# Patient Record
Sex: FEMALE | Race: BLACK OR AFRICAN AMERICAN | NOT HISPANIC OR LATINO | Employment: UNEMPLOYED | URBAN - NONMETROPOLITAN AREA
[De-identification: names, ages, dates, MRNs, and addresses within clinical notes are randomized per-mention and may not be internally consistent; named-entity substitution may affect disease eponyms.]

---

## 2017-08-10 PROCEDURE — 87660 TRICHOMONAS VAGIN DIR PROBE: CPT | Performed by: NURSE PRACTITIONER

## 2017-08-10 PROCEDURE — 87510 GARDNER VAG DNA DIR PROBE: CPT | Performed by: NURSE PRACTITIONER

## 2017-08-10 PROCEDURE — 87480 CANDIDA DNA DIR PROBE: CPT | Performed by: NURSE PRACTITIONER

## 2018-03-15 ENCOUNTER — APPOINTMENT (OUTPATIENT)
Dept: LAB | Facility: HOSPITAL | Age: 28
End: 2018-03-15

## 2018-03-15 ENCOUNTER — INITIAL PRENATAL (OUTPATIENT)
Dept: OBSTETRICS AND GYNECOLOGY | Facility: CLINIC | Age: 28
End: 2018-03-15

## 2018-03-15 VITALS — BODY MASS INDEX: 28.15 KG/M2 | WEIGHT: 149 LBS | SYSTOLIC BLOOD PRESSURE: 107 MMHG | DIASTOLIC BLOOD PRESSURE: 72 MMHG

## 2018-03-15 DIAGNOSIS — Z34.80 PRENATAL CARE OF MULTIGRAVIDA, ANTEPARTUM: ICD-10-CM

## 2018-03-15 DIAGNOSIS — O26.859 SPOTTING AFFECTING PREGNANCY, ANTEPARTUM: Primary | ICD-10-CM

## 2018-03-15 DIAGNOSIS — Z3A.00 WEEKS OF GESTATION OF PREGNANCY NOT SPECIFIED: ICD-10-CM

## 2018-03-15 LAB
ABO GROUP BLD: NORMAL
AMPHET+METHAMPHET UR QL: NEGATIVE
BARBITURATES UR QL SCN: NEGATIVE
BASOPHILS # BLD AUTO: 0.02 10*3/MM3 (ref 0–0.2)
BASOPHILS NFR BLD AUTO: 0.3 % (ref 0–2)
BENZODIAZ UR QL SCN: NEGATIVE
BILIRUB UR QL STRIP: NEGATIVE
BLD GP AB SCN SERPL QL: NEGATIVE
CANNABINOIDS SERPL QL: NEGATIVE
CLARITY UR: CLEAR
COCAINE UR QL: NEGATIVE
COLOR UR: YELLOW
DEPRECATED RDW RBC AUTO: 41.1 FL (ref 36.4–46.3)
EOSINOPHIL # BLD AUTO: 0.03 10*3/MM3 (ref 0–0.7)
EOSINOPHIL NFR BLD AUTO: 0.4 % (ref 0–7)
ERYTHROCYTE [DISTWIDTH] IN BLOOD BY AUTOMATED COUNT: 12.7 % (ref 11.5–14.5)
GLUCOSE UR STRIP-MCNC: NEGATIVE MG/DL
HCG INTACT+B SERPL-ACNC: 6307 MIU/ML
HCT VFR BLD AUTO: 36.1 % (ref 35–45)
HGB BLD-MCNC: 12.5 G/DL (ref 12–15.5)
HGB UR QL STRIP.AUTO: ABNORMAL
IMM GRANULOCYTES # BLD: 0.02 10*3/MM3 (ref 0–0.02)
IMM GRANULOCYTES NFR BLD: 0.3 % (ref 0–0.5)
KETONES UR QL STRIP: NEGATIVE
LEUKOCYTE ESTERASE UR QL STRIP.AUTO: NEGATIVE
LYMPHOCYTES # BLD AUTO: 1.27 10*3/MM3 (ref 0.6–4.2)
LYMPHOCYTES NFR BLD AUTO: 17.3 % (ref 10–50)
Lab: NORMAL
MCH RBC QN AUTO: 30.4 PG (ref 26.5–34)
MCHC RBC AUTO-ENTMCNC: 34.6 G/DL (ref 31.4–36)
MCV RBC AUTO: 87.8 FL (ref 80–98)
METHADONE UR QL SCN: NEGATIVE
MONOCYTES # BLD AUTO: 0.51 10*3/MM3 (ref 0–0.9)
MONOCYTES NFR BLD AUTO: 7 % (ref 0–12)
NEUTROPHILS # BLD AUTO: 5.48 10*3/MM3 (ref 2–8.6)
NEUTROPHILS NFR BLD AUTO: 74.7 % (ref 37–80)
NITRITE UR QL STRIP: NEGATIVE
OPIATES UR QL: NEGATIVE
OXYCODONE UR QL SCN: NEGATIVE
PH UR STRIP.AUTO: 7 [PH] (ref 5–9)
PLATELET # BLD AUTO: 284 10*3/MM3 (ref 150–450)
PMV BLD AUTO: 10.5 FL (ref 8–12)
PROT UR QL STRIP: NEGATIVE
RBC # BLD AUTO: 4.11 10*6/MM3 (ref 3.77–5.16)
RH BLD: POSITIVE
SP GR UR STRIP: 1.03 (ref 1–1.03)
UROBILINOGEN UR QL STRIP: ABNORMAL
WBC NRBC COR # BLD: 7.33 10*3/MM3 (ref 3.2–9.8)

## 2018-03-15 PROCEDURE — 87491 CHLMYD TRACH DNA AMP PROBE: CPT | Performed by: ADVANCED PRACTICE MIDWIFE

## 2018-03-15 PROCEDURE — G0432 EIA HIV-1/HIV-2 SCREEN: HCPCS | Performed by: ADVANCED PRACTICE MIDWIFE

## 2018-03-15 PROCEDURE — 86803 HEPATITIS C AB TEST: CPT | Performed by: ADVANCED PRACTICE MIDWIFE

## 2018-03-15 PROCEDURE — 80307 DRUG TEST PRSMV CHEM ANLYZR: CPT | Performed by: ADVANCED PRACTICE MIDWIFE

## 2018-03-15 PROCEDURE — 87661 TRICHOMONAS VAGINALIS AMPLIF: CPT | Performed by: ADVANCED PRACTICE MIDWIFE

## 2018-03-15 PROCEDURE — 87591 N.GONORRHOEAE DNA AMP PROB: CPT | Performed by: ADVANCED PRACTICE MIDWIFE

## 2018-03-15 PROCEDURE — 87086 URINE CULTURE/COLONY COUNT: CPT | Performed by: ADVANCED PRACTICE MIDWIFE

## 2018-03-15 PROCEDURE — 36415 COLL VENOUS BLD VENIPUNCTURE: CPT | Performed by: ADVANCED PRACTICE MIDWIFE

## 2018-03-15 PROCEDURE — 99212 OFFICE O/P EST SF 10 MIN: CPT | Performed by: ADVANCED PRACTICE MIDWIFE

## 2018-03-15 PROCEDURE — 84702 CHORIONIC GONADOTROPIN TEST: CPT | Performed by: ADVANCED PRACTICE MIDWIFE

## 2018-03-15 PROCEDURE — 81003 URINALYSIS AUTO W/O SCOPE: CPT | Performed by: ADVANCED PRACTICE MIDWIFE

## 2018-03-15 RX ORDER — PRENATAL VIT NO.126/IRON/FOLIC 28MG-0.8MG
TABLET ORAL DAILY
COMMUNITY
End: 2019-05-30

## 2018-03-16 LAB
BACTERIA SPEC AEROBE CULT: NORMAL
C TRACH RRNA CVX QL NAA+PROBE: NEGATIVE
HBV SURFACE AG SERPL QL IA: NEGATIVE
HCV AB SER DONR QL: NEGATIVE
HIV1+2 AB SER QL: NEGATIVE
N GONORRHOEA RRNA SPEC QL NAA+PROBE: NEGATIVE
RPR SER QL: NORMAL
RUBV IGG SER QL: ABNORMAL
RUBV IGG SER-ACNC: 224 IU/ML (ref 0–9.9)
T VAGINALIS DNA VAG QL PROBE+SIG AMP: NEGATIVE

## 2018-03-16 NOTE — PROGRESS NOTES
CC: New OB visit, history reviewed      ROS:Positive Nausea   Negative leaking fluid from the vagina, swelling in her legs, headache and visual changes      Educated on:Spent 30 minutes out of 45 minutes face to face counseling on nutrition, activity, diet, safety, prenatal care, medications approved in pregnancy, pregnancy discomforts, and testing.       A/Plan: f/u in 1 week/s   Ella was seen today for initial prenatal visit.    Diagnoses and all orders for this visit:    Spotting affecting pregnancy, antepartum  -     hCG, Quantitative, Pregnancy    Prenatal care of multigravida, antepartum  -     OB Panel With HIV  -     Cancel: Hepatitis C Antibody  -     Urine Culture - Urine, Urine, Clean Catch  -     Urinalysis - Urine, Clean Catch  -     Urine Drug Screen - Urine, Clean Catch  -     Chlamydia trachomatis, Neisseria gonorrhoeae, Trichomonas vaginalis, PCR - Urine, Urine, Clean Catch  -     RPR  -     CBC Auto Differential  -     Hepatitis B Surface Antigen  -     Rubella Antibody, IgG  -     OB Panel Type & Screen  -     HIV-1 & HIV-2 Antibodies  -     Hepatitis C Antibody  -     PREVIOUS HISTORY; Future  -     PREVIOUS HISTORY    Weeks of gestation of pregnancy not specified  -      Ob Transvaginal; Future

## 2018-03-21 ENCOUNTER — HOSPITAL ENCOUNTER (EMERGENCY)
Facility: HOSPITAL | Age: 28
Discharge: HOME | End: 2018-03-21
Attending: EMERGENCY MEDICINE | Admitting: EMERGENCY MEDICINE

## 2018-03-21 VITALS
TEMPERATURE: 99.2 F | BODY MASS INDEX: 27.23 KG/M2 | DIASTOLIC BLOOD PRESSURE: 76 MMHG | WEIGHT: 148 LBS | OXYGEN SATURATION: 100 % | RESPIRATION RATE: 18 BRPM | SYSTOLIC BLOOD PRESSURE: 133 MMHG | HEIGHT: 62 IN | HEART RATE: 73 BPM

## 2018-03-21 DIAGNOSIS — O03.9 MISCARRIAGE: Primary | ICD-10-CM

## 2018-03-21 LAB — HCG INTACT+B SERPL-ACNC: 3759.7 MIU/ML

## 2018-03-21 PROCEDURE — 99284 EMERGENCY DEPT VISIT MOD MDM: CPT

## 2018-03-21 PROCEDURE — 88305 TISSUE EXAM BY PATHOLOGIST: CPT | Performed by: PATHOLOGY

## 2018-03-21 PROCEDURE — 84702 CHORIONIC GONADOTROPIN TEST: CPT | Performed by: EMERGENCY MEDICINE

## 2018-03-21 PROCEDURE — 88305 TISSUE EXAM BY PATHOLOGIST: CPT | Performed by: EMERGENCY MEDICINE

## 2018-03-21 PROCEDURE — 96360 HYDRATION IV INFUSION INIT: CPT

## 2018-03-21 RX ORDER — HYDROCODONE BITARTRATE AND ACETAMINOPHEN 5; 325 MG/1; MG/1
1 TABLET ORAL EVERY 6 HOURS PRN
Status: DISCONTINUED | OUTPATIENT
Start: 2018-03-21 | End: 2018-03-22 | Stop reason: HOSPADM

## 2018-03-21 RX ORDER — HYDROCODONE BITARTRATE AND ACETAMINOPHEN 5; 325 MG/1; MG/1
1 TABLET ORAL ONCE
Status: COMPLETED | OUTPATIENT
Start: 2018-03-21 | End: 2018-03-21

## 2018-03-21 RX ORDER — IBUPROFEN 800 MG/1
800 TABLET ORAL EVERY 8 HOURS PRN
Qty: 30 TABLET | Refills: 0 | Status: SHIPPED | OUTPATIENT
Start: 2018-03-21 | End: 2019-05-30

## 2018-03-21 RX ORDER — SODIUM CHLORIDE 9 MG/ML
INJECTION, SOLUTION INTRAVENOUS
Status: COMPLETED
Start: 2018-03-21 | End: 2018-03-21

## 2018-03-21 RX ADMIN — SODIUM CHLORIDE 1000 ML: 900 INJECTION, SOLUTION INTRAVENOUS at 22:00

## 2018-03-21 RX ADMIN — HYDROCODONE BITARTRATE AND ACETAMINOPHEN 1 TABLET: 5; 325 TABLET ORAL at 21:30

## 2018-03-22 NOTE — ED NOTES
Called Corrie in Pharmacy to let her know prescription had been faxed, she said she will call when its ready.     Adilia Nicholas RN  03/21/18 [id number]

## 2018-03-22 NOTE — ED NOTES
"Pt [facility] positive preg test 3-6-18 [facility]. Pt had US on 3-15 and states \"they didn't see or hear anything.\" States she has been spotting since the 15th and passed a large clot today. Patient brought in products of conception which is observed to be a small fetus within an amniotic sac. By reported LMP of 1/5/18 the pt was approx. 10w 5d.      Adilia Nicholas RN  03/21/18 [id number]    "

## 2018-03-22 NOTE — ED PROVIDER NOTES
Subjective   History of Present Illness  28-year-old female presents to the emergency department because of lower abdominal pain and cramping with increasing bleeding and blood clots per vagina.  She is a  female was recently seen in OB clinic for this pregnancy.  She is approximately 10 weeks gestational age.  In the clinic she had a beta quant of 6000 and ultrasound that did not show any process of conception.  Today she comes in because of increased bleeding cramping actually passed proximal of conception while the emergency department.  She is complaining some lower abdominal pain and little bit of bleeding.  She has an appointment next week to see  Friday  Review of Systems   Constitutional: Negative for chills and fever.   HENT: Negative for rhinorrhea, sinus pressure and sneezing.    Eyes: Negative for pain and redness.   Respiratory: Negative for cough, chest tightness and shortness of breath.    Gastrointestinal: Negative for abdominal pain, diarrhea, nausea and vomiting.   Genitourinary: Positive for pelvic pain and vaginal bleeding. Negative for dysuria and flank pain.   Musculoskeletal: Negative for arthralgias, back pain and joint swelling.   Neurological: Negative for dizziness, syncope, weakness, numbness and headaches.   Hematological: Negative.    Psychiatric/Behavioral: Negative.        Past Medical History:   Diagnosis Date   • Chlamydia    • Migraine        No Known Allergies    Past Surgical History:   Procedure Laterality Date   •  SECTION      Low Transverse 10/24/2006       Family History   Problem Relation Age of Onset   • Diabetes Mother        Social History     Social History   • Marital status: Single     Social History Main Topics   • Smoking status: Former Smoker     Quit date: 3/13/2018   • Smokeless tobacco: Current User   • Alcohol use Yes      Comment: weekends    • Drug use: No   • Sexual activity: Yes     Partners: Male     Other Topics Concern   • Not on file            Objective   Physical Exam   Constitutional: She is oriented to person, place, and time. She appears well-developed and well-nourished.   HENT:   Head: Normocephalic and atraumatic.   Eyes: EOM are normal. Pupils are equal, round, and reactive to light.   Neck: Normal range of motion. Neck supple.   Cardiovascular: Normal rate and regular rhythm.    Pulmonary/Chest: Effort normal.   Abdominal: Soft. Bowel sounds are normal. She exhibits no distension. There is no tenderness. There is no guarding.   Genitourinary:   Genitourinary Comments: Os is closed.  She has some blood clots in the vaginal vault with a small amount of oozing of blood from the cervix.  Mild tenderness.  Do not see any obvious retained process of conception in the cervical canal.   Musculoskeletal: Normal range of motion.   Neurological: She is alert and oriented to person, place, and time.   Skin: Skin is warm and dry. Capillary refill takes less than 2 seconds.   Psychiatric: She has a normal mood and affect.   Nursing note and vitals reviewed.      Procedures         ED Course  ED Course        Labs Reviewed   HCG, QUANTITATIVE, PREGNANCY               MDM  Number of Diagnoses or Management Options  Diagnosis management comments: Patient with a miscarriage.  We have sent the products of conception to the pathology lab.  We will order a beta Quant for trending.  Pain control and follow-up with OB/GYN.  I discussed the case with  Frisocorro who agrees the patient can be seen next week in clinic.  She is hemodynamically stable.  She is not having a large amount of bleeding.  Discussed return precautions with the patient.  Patient had an ABO Rh done last week that showed that the patient was Rh+ so she does not rhogam.      Final diagnoses:   Miscarriage            Bladimir Siegel MD  03/21/18 [id number]

## 2018-03-23 DIAGNOSIS — O02.1 MISSED AB: Primary | ICD-10-CM

## 2018-03-26 ENCOUNTER — OFFICE VISIT (OUTPATIENT)
Dept: OBSTETRICS AND GYNECOLOGY | Facility: CLINIC | Age: 28
End: 2018-03-26

## 2018-03-26 ENCOUNTER — LAB (OUTPATIENT)
Dept: LAB | Facility: HOSPITAL | Age: 28
End: 2018-03-26

## 2018-03-26 VITALS
BODY MASS INDEX: 28.16 KG/M2 | WEIGHT: 153 LBS | DIASTOLIC BLOOD PRESSURE: 63 MMHG | HEIGHT: 62 IN | SYSTOLIC BLOOD PRESSURE: 98 MMHG

## 2018-03-26 DIAGNOSIS — O02.1 MISSED AB: ICD-10-CM

## 2018-03-26 DIAGNOSIS — O03.9 SPONTANEOUS ABORTION: Primary | ICD-10-CM

## 2018-03-26 LAB — HCG INTACT+B SERPL-ACNC: 107.3 MIU/ML

## 2018-03-26 PROCEDURE — 99212 OFFICE O/P EST SF 10 MIN: CPT | Performed by: ADVANCED PRACTICE MIDWIFE

## 2018-03-26 PROCEDURE — 36415 COLL VENOUS BLD VENIPUNCTURE: CPT

## 2018-03-26 PROCEDURE — 84702 CHORIONIC GONADOTROPIN TEST: CPT

## 2018-03-26 NOTE — PROGRESS NOTES
"Subjective   Chief Complaint   Patient presents with   • Follow-up     was in ER on 3/21 for bleeding in pregnancy. quant today 107.30. pt states she is in no pain now and she stopped bleeding      Ella Suazo is a 28 y.o. year old  who comes to review her recent testing and discuss next steps.      Review of Systems   Respiratory: Negative.    Cardiovascular: Negative.    Gastrointestinal: Negative for abdominal pain.   Genitourinary: Negative for vaginal bleeding.   Skin: Negative.           Objective   BP 98/63   Ht 157.5 cm (62\")   Wt 69.4 kg (153 lb)   LMP 2018 (Within Days)   Breastfeeding? No   BMI 27.98 kg/m²            Lab Review   HCG      Imaging   No data reviewed           Assessment   1. Spontaneous      Plan   1. Repeat quantitative HCG next week  2. Will wait a few months before trying to conceive again         This note was electronically signed.    "

## 2018-03-27 LAB
LAB AP CASE REPORT: NORMAL
LAB AP CLINICAL INFORMATION: NORMAL
Lab: NORMAL
PATH REPORT.FINAL DX SPEC: NORMAL
PATH REPORT.GROSS SPEC: NORMAL

## 2019-05-23 ENCOUNTER — OFFICE VISIT (OUTPATIENT)
Dept: FAMILY MEDICINE CLINIC | Facility: CLINIC | Age: 29
End: 2019-05-23

## 2019-05-23 VITALS
BODY MASS INDEX: 25.84 KG/M2 | HEART RATE: 88 BPM | WEIGHT: 140.44 LBS | DIASTOLIC BLOOD PRESSURE: 80 MMHG | HEIGHT: 62 IN | SYSTOLIC BLOOD PRESSURE: 116 MMHG | OXYGEN SATURATION: 99 %

## 2019-05-23 DIAGNOSIS — K64.4 EXTERNAL HEMORRHOIDS: Primary | ICD-10-CM

## 2019-05-23 PROCEDURE — 99213 OFFICE O/P EST LOW 20 MIN: CPT | Performed by: FAMILY MEDICINE

## 2019-05-23 NOTE — PROGRESS NOTES
Subjective:     Ella Suazo is a 29 y.o. female who presents for initial evaluation for hemorrhoids.     Patient presents for evaluation of possible hemorrhoids. Onset of symptoms was sudden. Symptoms have been gradually worsening since that time; symptoms worsen through the course of the day. Symptoms include: pain with sitting and pain upon rising. Reports daily BMs. Denies pain with defecation and rectal bleeding. Patient denies family hx of colorectal CA and melena.  Treatment to date has been cleaning with warm wash cloths.    Preventative:  PHQ-9 Depression Screening  Little interest or pleasure in doing things? 2   Feeling down, depressed, or hopeless? 2   Trouble falling or staying asleep, or sleeping too much? 2   Feeling tired or having little energy? 2   Poor appetite or overeating? 3   Feeling bad about yourself - or that you are a failure or have let yourself or your family down? 2   Trouble concentrating on things, such as reading the newspaper or watching television? 3   Moving or speaking so slowly that other people could have noticed? Or the opposite - being so fidgety or restless that you have been moving around a lot more than usual? 1   Thoughts that you would be better off dead, or of hurting yourself in some way? 0   PHQ-9 Total Score 17   If you checked off any problems, how difficult have these problems made it for you to do your work, take care of things at home, or get along with other people? Somewhat difficult     Total Score   Depression Severity  1-4    Minimal depression  5-9    Mild depression  10-14    Moderate depression  15-19    Moderately severe depression  20-27    Severe depression    On osteoporosis therapy? No     Past Medical Hx:  Past Medical History:   Diagnosis Date   • Chlamydia    • Migraine        Past Surgical Hx:  Past Surgical History:   Procedure Laterality Date   •  SECTION      Low Transverse 10/24/2006       Health Maintenance:  Health Maintenance    Topic Date Due   • ANNUAL PHYSICAL  1991   • TDAP/TD VACCINES (1 - Tdap) 2007   • PAP SMEAR  08/10/2017   • INFLUENZA VACCINE  2019       Current Meds:    Current Outpatient Medications:   •  ibuprofen (ADVIL,MOTRIN) 800 MG tablet, Take 1 tablet by mouth Every 8 (Eight) Hours As Needed for Mild Pain ., Disp: 30 tablet, Rfl: 0  •  Prenatal Vit-Fe Fumarate-FA (PRENATAL, CLASSIC, VITAMIN) 28-0.8 MG tablet tablet, Take  by mouth Daily., Disp: , Rfl:     Allergies:  Patient has no known allergies.    Family Hx:  Family History   Problem Relation Age of Onset   • Diabetes Mother         Social History:  Social History     Socioeconomic History   • Marital status: Single     Spouse name: Not on file   • Number of children: Not on file   • Years of education: Not on file   • Highest education level: Not on file   Tobacco Use   • Smoking status: Former Smoker     Last attempt to quit: 3/13/2018     Years since quittin.1   • Smokeless tobacco: Current User   Substance and Sexual Activity   • Alcohol use: Yes     Comment: weekends    • Drug use: No   • Sexual activity: Yes     Partners: Male       Review of Systems  Review of Systems   Constitutional: Negative for chills, diaphoresis and fever.   HENT: Negative for congestion, rhinorrhea, sneezing and sore throat.    Eyes: Negative for discharge and redness.   Respiratory: Negative for cough, shortness of breath and wheezing.    Cardiovascular: Negative for chest pain and leg swelling.   Gastrointestinal: Positive for rectal pain. Negative for abdominal pain, diarrhea, nausea and vomiting.   Genitourinary: Negative for difficulty urinating, dysuria, genital sores and hematuria.   Musculoskeletal: Negative for gait problem and joint swelling.   Skin: Negative for rash and wound.   Neurological: Negative for seizures, syncope, light-headedness and headaches.   Psychiatric/Behavioral: Negative for behavioral problems, confusion and sleep disturbance.  "      Objective:     /80   Pulse 88   Ht 157.5 cm (62\")   Wt 63.7 kg (140 lb 7 oz)   SpO2 99%   BMI 25.69 kg/m²     Physical Exam   Constitutional: She is oriented to person, place, and time. She appears well-developed and well-nourished. No distress.   HENT:   Head: Normocephalic and atraumatic.   Nose: Nose normal.   Mouth/Throat: Oropharynx is clear and moist.   Eyes: Conjunctivae and EOM are normal. Pupils are equal, round, and reactive to light. No scleral icterus.   Neck: Neck supple. No tracheal deviation present. No thyromegaly present.   Cardiovascular: Normal rate, regular rhythm, normal heart sounds and intact distal pulses.   Pulmonary/Chest: Effort normal and breath sounds normal.   Abdominal: Soft. Bowel sounds are normal. There is no tenderness.   Genitourinary: Rectal exam shows external hemorrhoid and tenderness. Rectal exam shows no fissure.   Genitourinary Comments: Exam chaperoned by Bronson Jaramillo MA   Musculoskeletal: She exhibits no edema or deformity.   Lymphadenopathy:     She has no cervical adenopathy.   Neurological: She is alert and oriented to person, place, and time.   Skin: Skin is warm and dry. Capillary refill takes less than 2 seconds. No rash noted. She is not diaphoretic.   Psychiatric: She has a normal mood and affect. Her behavior is normal. Judgment and thought content normal.   Vitals reviewed.      Lab Results   Component Value Date    WBC 7.33 03/15/2018    HGB 12.5 03/15/2018    HCT 36.1 03/15/2018    MCV 87.8 03/15/2018     03/15/2018     Lab Results   Component Value Date    GLUCOSE 98 07/10/2015    BUN 6 (L) 07/10/2015    CREATININE 0.6 07/10/2015    K 4.2 07/10/2015    CO2 26 07/10/2015    CALCIUM 9.2 07/10/2015        Assessment/Plan:     Ella was seen today for hemorrhoids.    Diagnoses and all orders for this visit:    External hemorrhoids  -    Hemorrhoids are not thrombosed.  - phenylephrine-mineral oil-petrolatum (PREPARATION H) 0.25-14-74.9 % " ointment hemorrhoidal ointment; Insert 1 application into the rectum 2 (Two) Times a Day As Needed (hemorrhoids).  - Patient provided with educational material on sitz baths.  - Encourage p.o. hydration and increasing fiber intake to avoid constipation and hard stools.    Follow-up:     Return in about 1 week (around 5/30/2019) for Recheck hemorrhoids.    Goals        Patient Stated    • Pain relief (pt-stated)      Patient evaluated for external hemorrhoids.  Barriers to goal: None            Preventative:  -Patient's Body mass index is 25.69 kg/m². BMI is within normal parameters. No follow-up required..    -Screening pap smear: not up to date. Will address at next visit.    Vaccines:    There is no immunization history on file for this patient.  Records requested.    Tetanus vaccine: not up to date. Records requested.   Annual influenza vaccine: not up to date. Will address during influenza season.  Pneumococcal vaccine: not up to date. Will address at next visit.  Hep B vaccine: not up to date. Records requested.       RISK SCORE: 3    Signature  Tammi Martinez MD  [facility] Family Medicine Resident, PGY III        This document has been electronically signed by Tammi Martinez MD on May 23, 2019 11:04 AM

## 2019-05-23 NOTE — PATIENT INSTRUCTIONS
How to Take a Sitz Bath  A sitz bath is a warm water bath that is taken while you are sitting down. The water should only come up to your hips and should cover your buttocks. Your health care provider may recommend a sitz bath to help you:  · Clean the lower part of your body, including your genital area.  · With itching.  · With pain.  · With sore muscles or muscles that tighten or spasm.    How to take a sitz bath  Take 3-4 sitz baths per day or as told by your health care provider.  1. Partially fill a bathtub with warm water. You will only need the water to be deep enough to cover your hips and buttocks when you are sitting in it.  2. If your health care provider told you to put medicine in the water, follow the directions exactly.  3. Sit in the water and open the tub drain a little.  4. Turn on the warm water again to keep the tub at the correct level. Keep the water running constantly.  5. Soak in the water for 15-20 minutes or as told by your health care provider.  6. After the sitz bath, pat the affected area dry first. Do not rub it.  7. Be careful when you stand up after the sitz bath because you may feel dizzy.    Contact a health care provider if:  · Your symptoms get worse. Do not continue with sitz baths if your symptoms get worse.  · You have new symptoms. Do not continue with sitz baths until you talk with your health care provider.  This information is not intended to replace advice given to you by your health care provider. Make sure you discuss any questions you have with your health care provider.  Document Released: 09/09/2005 Document Revised: 05/17/2017 Document Reviewed: 12/16/2015  [employer] Interactive Patient Education © 2019 [employer]

## 2019-05-28 NOTE — PROGRESS NOTES
I have seen the patient.  I have reviewed the notes, assessments, and/or procedures performed by Tammi Martinez MD, I concur with her/his documentation and assessment and plan for Ella Monteiroa Gabriele.               This document has been electronically signed by Oscar Richardson MD on May 28, 2019 6:36 PM

## 2019-05-30 ENCOUNTER — OFFICE VISIT (OUTPATIENT)
Dept: FAMILY MEDICINE CLINIC | Facility: CLINIC | Age: 29
End: 2019-05-30

## 2019-05-30 VITALS
HEART RATE: 90 BPM | SYSTOLIC BLOOD PRESSURE: 112 MMHG | OXYGEN SATURATION: 99 % | HEIGHT: 62 IN | WEIGHT: 142.5 LBS | BODY MASS INDEX: 26.22 KG/M2 | DIASTOLIC BLOOD PRESSURE: 78 MMHG

## 2019-05-30 DIAGNOSIS — F41.1 GENERALIZED ANXIETY DISORDER: ICD-10-CM

## 2019-05-30 DIAGNOSIS — Z72.0 TOBACCO USE: ICD-10-CM

## 2019-05-30 DIAGNOSIS — K64.4 EXTERNAL HEMORRHOID: Primary | ICD-10-CM

## 2019-05-30 PROCEDURE — 99213 OFFICE O/P EST LOW 20 MIN: CPT | Performed by: FAMILY MEDICINE

## 2019-05-30 PROCEDURE — 99406 BEHAV CHNG SMOKING 3-10 MIN: CPT | Performed by: FAMILY MEDICINE

## 2019-05-30 RX ORDER — CITALOPRAM 10 MG/1
TABLET ORAL
Qty: 42 TABLET | Refills: 0 | Status: SHIPPED | OUTPATIENT
Start: 2019-05-30 | End: 2019-06-27

## 2019-05-30 NOTE — PATIENT INSTRUCTIONS
Supporting Someone With Anxiety  Anxiety disorders are mental health conditions that cause overwhelming feelings of nervousness or worry. These feelings interfere with daily activities and relationships. Anxiety disorders include:  · Generalized anxiety disorder (SONIA).  · Social anxiety.  · Post-traumatic stress disorder (PTSD).    When a person has an anxiety disorder, his or her condition can affect others around him or her, such as friends and family members. Friends and family can help by offering support and understanding.  What do I need to know about this condition?  Anxiety is the mental and physical experience of nervousness or worry that you might feel when you think about a stressful event. Occasional anxiety is normal, but a person with an anxiety disorder becomes preoccupied with this worry. He or she may know that the anxiety is not logical, but knowing this does not relieve the discomfort that he or she feels. Anxiety disorders cause a great deal of distress and prevent someone from having a normal daily life. Someone with an anxiety disorder may:  · Experience anxiety that:  ? May or may not have a specific trigger.  ? Lasts for long periods of time.  ? Causes physical problems over time.  ? Is far more intense than normal anticipation.  ? Occurs at unpredictable times.  · Feel restless or edgy.  · Get fatigued easily.  · Have trouble focusing.  · Have muscle tension.  · Have trouble falling asleep or staying asleep.  · Be irritable and occasionally have sudden expressions of strong feelings (outbursts).  · Have worries that do not make sense to you.    What do I need to know about the treatment options?  Anxiety disorders are generally very treatable by mental health providers such as psychologists, psychiatrists, and clinical social workers. Treatment may include one or more of the following:  · Psychotherapy, also called talk therapy or counseling. Types of psychotherapy that are used to treat  anxiety include:  ? Cognitive behavioral therapy (CBT). This type of therapy teaches a person how to recognize unhealthy feelings, thoughts, and behaviors, and how to replace those feelings with positive thoughts and actions.  ? Behavior therapy that trains a person to relax and self-soothe. This also involves gradually exposing the person to the cause of the anxiety (progressive exposure therapy).  ? Biofeedback. This type of therapy focuses on trying to control certain body functions, like heart rate, to lessen the physical impact of anxiety.  ? Mindfulness-based stress reduction training. This uses education, meditation, and yoga to help a person stay focused on the present instead of living in the past or worrying about the future.  ? Acceptance and commitment therapy (ACT). This helps a person to focus on acceptance, rather than trying to control every situation.  ? Family therapy. This treatment helps family members to communicate and deal with conflict in healthy ways.  · Medicine to treat anxiety and help to control certain emotions and behaviors.  · Mind-body programs. These programs encourage the person with anxiety to be involved in his or her treatment and feel empowered. Mind-body programs may include mindfulness-based stress reduction training, yoga, or emigdio chi.    How can I support my loved one?  Talk about the condition  Good communication is the key to supporting your friend or family member. Here are a few things to keep in mind:  · Be careful about too much prodding. Try not to overdo reminders to an adult friend or family member about things like taking medicines. Ask how your loved one prefers that you help.  · Ask questions and then listen to your loved one's response. Be available if your friend or family member wants to talk, but give your loved one space if he or she does not feel like talking.  · Never ignore comments about suicide, and do not try to avoid the subject of suicide. Talking  about suicide will not make your loved one want to act on it. You or your loved one can reach out 24 hours a day to get free, private support (on the phone or a live online chat) from a suicide crisis helpline, such as the National Suicide Prevention Lifeline at 1-100.831.2737.  · Be encouraging and offer emotional support. This can help to lower stress. Even saying something simple to comfort your loved one may help.  · If your loved one is open to it, go with him or her to visits with a counselor or health care provider. Get suggestions directly from your loved one's care providers about when to get help if you are concerned about behavior changes. Privacy laws limit how much a person's health care provider can share with you without your loved one's permission, but if you feel that a situation is an emergency, do not wait to call a health care provider or emergency services.    Find support and resources  A health care provider may be able to recommend mental health resources that are available online or over the phone. You could start with:  · Government sites such as the Substance Abuse and Mental Health Services Administration (SAMHSA): www.samhsa.gov  · National mental health organizations such as the National [place] [employer] (NURY): www.nury.org    You may also consider:  · Joining self-help and support groups, not only for your friend or family member, but also for yourself. People in these peer and family support groups understand what you and your loved one are going through. They can help you feel a sense of hope and connect you with local resources to help you learn more.  · Family therapy.    General support  · Make an effort to learn all you can about your loved one's form of anxiety.  · Include your loved one in activities. Invite him or her to go for walks and outings.  · Help your loved one follow his or her treatment plan as directed by health care providers. This could mean driving him  "or her to therapy sessions or suggesting ways to cope with stress.  · Remember that your support really matters. Social support is a huge benefit for someone who is coping with anxiety.  How can I create a safe environment?  · For certain types of anxiety, such as PTSD, you may want to:  ? Remove alcohol and prescription medicines from your loved one's home, or limit the amount of these substances in the home. This can help to prevent your loved one from abusing alcohol and prescription medicines.  ? Remove or lock up guns and other weapons. If you do not have a safe place to keep a gun, local law enforcement may store a gun for you.  · Make a written crisis plan. Include important phone numbers, such as the local crisis intervention team. Make sure that:  ? The person with anxiety knows about this plan and agrees with it.  ? Everyone who has regular contact with the person knows about the plan and knows what to do in an emergency.  ? The written plan is easily accessible and can be quickly put into action.  How should I care for myself?  It is important to find ways to care for your body, mind, and well-being while supporting someone with anxiety.  · Try to maintain your normal routines. This can help you remember that your life is about more than your loved one's condition.  · Understand what your limits are. Say \"no\" to requests or events that lead to a schedule that is too busy.  · Make time for activities that help you relax, and try to not feel guilty about taking time for yourself.  · Spend time with friends and family.  · Consider trying meditation and deep breathing exercises to lower your stress. Attend some mind-body classes by yourself or with your loved one.  · Get plenty of sleep.  · Exercise, even if it is just taking a short walk a few times a week.  · If you are struggling emotionally with guilt, fear, or anger, consider working with a therapist.    What are some signs that the condition is getting " "worse?  Signs that your loved one's condition may be getting worse include:  · Dramatic mood swings.  · Staying away from activities that he or she used to enjoy.  · Drinking more alcohol than normal.  · Either seeming tearful or seeming to lack emotion.  · Talking about \"not feeling right.\"  · Staying away from others (isolating himself or herself).    Get help right away if:  · Your loved one expresses thoughts about harming himself or herself or others.  · Your loved one's behavior becomes hard to predict (erratic).  · Your loved one shows behavior that does not make sense with the current time, place, or circumstances. These behaviors may include seeing, feeling, tasting, or hearing things that are not real (hallucinations) or having flashbacks.  If you ever feel like your loved one may hurt himself or herself or others, or may have thoughts about taking his or her own life, get help right away. You can go to your nearest emergency department or call:  · Your local emergency services (911 in the [place]  · A suicide crisis helpline, such as the National Suicide Prevention Lifeline at 1-921.207.2514. This is open 24 hours a day.    Summary  · People with anxiety disorders experience overwhelming feelings of nervousness or worry. Friends and family can help by offering support and understanding.  · Anxiety disorders are generally very treatable by mental health providers. They can be treated with psychotherapy (also known as talk therapy), behavior therapy, medicine, and mind-body programs.  · Be compassionate and listen to your loved one. Be available if your friend or family member wants to talk, but give your loved one space if he or she does not feel like talking.  · Find ways to care for your own body, mind, and well-being while supporting someone with anxiety. Try to maintain your normal routines and make time to do things that you enjoy.  This information is not intended to replace advice given to you by " your health care provider. Make sure you discuss any questions you have with your health care provider.  Document Released: 05/01/2018 Document Revised: 05/01/2018 Document Reviewed: 05/01/2018  [employer] Interactive Patient Education © 2019 [employer]    Steps to Quit Smoking  Smoking tobacco can be harmful to your health and can affect almost every organ in your body. Smoking puts you, and those around you, at risk for developing many serious chronic diseases. Quitting smoking is difficult, but it is one of the best things that you can do for your health. It is never too late to quit.  What are the benefits of quitting smoking?  When you quit smoking, you lower your risk of developing serious diseases and conditions, such as:  · Lung cancer or lung disease, such as COPD.  · Heart disease.  · Stroke.  · Heart attack.  · Infertility.  · Osteoporosis and bone fractures.    Additionally, symptoms such as coughing, wheezing, and shortness of breath may get better when you quit. You may also find that you get sick less often because your body is stronger at fighting off colds and infections. If you are pregnant, quitting smoking can help to reduce your chances of having a baby of low birth weight.  How do I get ready to quit?  When you decide to quit smoking, create a plan to make sure that you are successful. Before you quit:  · Pick a date to quit. Set a date within the next two weeks to give you time to prepare.  · Write down the reasons why you are quitting. Keep this list in places where you will see it often, such as on your bathroom mirror or in your car or wallet.  · Identify the people, places, things, and activities that make you want to smoke (triggers) and avoid them. Make sure to take these actions:  ? Throw away all cigarettes at home, at work, and in your car.  ? Throw away smoking accessories, such as ashtrays and lighters.  ? Clean your car and make sure to empty the ashtray.  ? Clean your home,  including curtains and carpets.  · Tell your family, friends, and coworkers that you are quitting. Support from your loved ones can make quitting easier.  · Talk with your health care provider about your options for quitting smoking.  · Find out what treatment options are covered by your health insurance.    What strategies can I use to quit smoking?  Talk with your healthcare provider about different strategies to quit smoking. Some strategies include:  · Quitting smoking altogether instead of gradually lessening how much you smoke over a period of time. Research shows that quitting “cold turkey” is more successful than gradually quitting.  · Attending in-person counseling to help you build problem-solving skills. You are more likely to have success in quitting if you attend several counseling sessions. Even short sessions of 10 minutes can be effective.  · Finding resources and support systems that can help you to quit smoking and remain smoke-free after you quit. These resources are most helpful when you use them often. They can include:  ? Online chats with a counselor.  ? Telephone quitlines.  ? Printed self-help materials.  ? Support groups or group counseling.  ? Text messaging programs.  ? Mobile phone applications.  · Taking medicines to help you quit smoking. (If you are pregnant or breastfeeding, talk with your health care provider first.) Some medicines contain nicotine and some do not. Both types of medicines help with cravings, but the medicines that include nicotine help to relieve withdrawal symptoms. Your health care provider may recommend:  ? Nicotine patches, gum, or lozenges.  ? Nicotine inhalers or sprays.  ? Non-nicotine medicine that is taken by mouth.    Talk with your health care provider about combining strategies, such as taking medicines while you are also receiving in-person counseling. Using these two strategies together makes you more likely to succeed in quitting than if you used  either strategy on its own.  If you are pregnant or breastfeeding, talk with your health care provider about finding counseling or other support strategies to quit smoking. Do not take medicine to help you quit smoking unless told to do so by your health care provider.  What things can I do to make it easier to quit?  Quitting smoking might feel overwhelming at first, but there is a lot that you can do to make it easier. Take these important actions:  · Reach out to your family and friends and ask that they support and encourage you during this time. Call telephone quitlines, reach out to support groups, or work with a counselor for support.  · Ask people who smoke to avoid smoking around you.  · Avoid places that trigger you to smoke, such as bars, parties, or smoke-break areas at work.  · Spend time around people who do not smoke.  · Lessen stress in your life, because stress can be a smoking trigger for some people. To lessen stress, try:  ? Exercising regularly.  ? Deep-breathing exercises.  ? Yoga.  ? Meditating.  ? Performing a body scan. This involves closing your eyes, scanning your body from head to toe, and noticing which parts of your body are particularly tense. Purposefully relax the muscles in those areas.  · Download or purchase mobile phone or tablet apps (applications) that can help you stick to your quit plan by providing reminders, tips, and encouragement. There are many free apps, such as QuitGuide from the CDC (Centers for Disease Control and Prevention). You can find other support for quitting smoking (smoking cessation) through smokefree.gov and other websites.    How will I feel when I quit smoking?  Within the first 24 hours of quitting smoking, you may start to feel some withdrawal symptoms. These symptoms are usually most noticeable 2-3 days after quitting, but they usually do not last beyond 2-3 weeks. Changes or symptoms that you might experience include:  · Mood swings.  · Restlessness,  anxiety, or irritation.  · Difficulty concentrating.  · Dizziness.  · Strong cravings for sugary foods in addition to nicotine.  · Mild weight gain.  · Constipation.  · Nausea.  · Coughing or a sore throat.  · Changes in how your medicines work in your body.  · A depressed mood.  · Difficulty sleeping (insomnia).    After the first 2-3 weeks of quitting, you may start to notice more positive results, such as:  · Improved sense of smell and taste.  · Decreased coughing and sore throat.  · Slower heart rate.  · Lower blood pressure.  · Clearer skin.  · The ability to breathe more easily.  · Fewer sick days.    Quitting smoking is very challenging for most people. Do not get discouraged if you are not successful the first time. Some people need to make many attempts to quit before they achieve long-term success. Do your best to stick to your quit plan, and talk with your health care provider if you have any questions or concerns.  This information is not intended to replace advice given to you by your health care provider. Make sure you discuss any questions you have with your health care provider.  Document Released: 12/12/2002 Document Revised: 07/24/2018 Document Reviewed: 05/03/2016  [employer] Interactive Patient Education © 2019 [employer]
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
## 2019-05-31 NOTE — PROGRESS NOTES
I have seen the patient.  I have reviewed the notes, assessments, and/or procedures performed by Tammi Martinez MD, I concur with her/his documentation and assessment and plan for Ella Monteiroa Gabriele.               This document has been electronically signed by Oscar Richardson MD on May 31, 2019 9:18 AM

## 2019-06-21 ENCOUNTER — LAB (OUTPATIENT)
Dept: LAB | Facility: HOSPITAL | Age: 29
End: 2019-06-21

## 2019-06-21 ENCOUNTER — PROCEDURE VISIT (OUTPATIENT)
Dept: FAMILY MEDICINE CLINIC | Facility: CLINIC | Age: 29
End: 2019-06-21

## 2019-06-21 VITALS
HEIGHT: 62 IN | HEART RATE: 81 BPM | DIASTOLIC BLOOD PRESSURE: 76 MMHG | SYSTOLIC BLOOD PRESSURE: 116 MMHG | BODY MASS INDEX: 25.48 KG/M2 | WEIGHT: 138.44 LBS | OXYGEN SATURATION: 99 %

## 2019-06-21 DIAGNOSIS — Z11.51 ENCOUNTER FOR SCREENING FOR HUMAN PAPILLOMAVIRUS (HPV): Primary | ICD-10-CM

## 2019-06-21 DIAGNOSIS — Z11.3 ROUTINE SCREENING FOR STI (SEXUALLY TRANSMITTED INFECTION): ICD-10-CM

## 2019-06-21 DIAGNOSIS — Z11.3 ROUTINE SCREENING FOR STI (SEXUALLY TRANSMITTED INFECTION): Primary | ICD-10-CM

## 2019-06-21 LAB
CANDIDA ALBICANS: NEGATIVE
GARDNERELLA VAGINALIS: NEGATIVE
T VAGINALIS DNA VAG QL PROBE+SIG AMP: NEGATIVE

## 2019-06-21 PROCEDURE — 87480 CANDIDA DNA DIR PROBE: CPT | Performed by: FAMILY MEDICINE

## 2019-06-21 PROCEDURE — G0123 SCREEN CERV/VAG THIN LAYER: HCPCS | Performed by: FAMILY MEDICINE

## 2019-06-21 PROCEDURE — 99395 PREV VISIT EST AGE 18-39: CPT | Performed by: FAMILY MEDICINE

## 2019-06-21 PROCEDURE — 86696 HERPES SIMPLEX TYPE 2 TEST: CPT

## 2019-06-21 PROCEDURE — 86592 SYPHILIS TEST NON-TREP QUAL: CPT

## 2019-06-21 PROCEDURE — 87591 N.GONORRHOEAE DNA AMP PROB: CPT

## 2019-06-21 PROCEDURE — 87661 TRICHOMONAS VAGINALIS AMPLIF: CPT

## 2019-06-21 PROCEDURE — 87491 CHLMYD TRACH DNA AMP PROBE: CPT

## 2019-06-21 PROCEDURE — 86695 HERPES SIMPLEX TYPE 1 TEST: CPT

## 2019-06-21 PROCEDURE — 86694 HERPES SIMPLEX NES ANTBDY: CPT

## 2019-06-21 PROCEDURE — G0432 EIA HIV-1/HIV-2 SCREEN: HCPCS | Performed by: FAMILY MEDICINE

## 2019-06-21 PROCEDURE — 87660 TRICHOMONAS VAGIN DIR PROBE: CPT | Performed by: FAMILY MEDICINE

## 2019-06-21 PROCEDURE — 87510 GARDNER VAG DNA DIR PROBE: CPT | Performed by: FAMILY MEDICINE

## 2019-06-21 PROCEDURE — 36415 COLL VENOUS BLD VENIPUNCTURE: CPT

## 2019-06-21 PROCEDURE — 87624 HPV HI-RISK TYP POOLED RSLT: CPT | Performed by: FAMILY MEDICINE

## 2019-06-22 LAB
C TRACH RRNA CVX QL NAA+PROBE: NEGATIVE
HIV1+2 AB SER QL: NORMAL
N GONORRHOEA RRNA SPEC QL NAA+PROBE: NEGATIVE
RPR SER QL: NORMAL
TRICHOMONAS VAGINALIS PCR: NEGATIVE

## 2019-06-24 ENCOUNTER — TELEPHONE (OUTPATIENT)
Dept: FAMILY MEDICINE CLINIC | Facility: CLINIC | Age: 29
End: 2019-06-24

## 2019-06-24 LAB
HSV1 IGG SER IA-ACNC: <0.91 INDEX (ref 0–0.9)
HSV1+2 IGM SER IA-ACNC: 2.59 RATIO (ref 0–0.9)
HSV2 IGG SER IA-ACNC: <0.91 INDEX (ref 0–0.9)

## 2019-06-24 NOTE — PROGRESS NOTES
Subjective:     Ella Suazo is a 29 y.o.  female who presents for cervical cancer screening.     Cervical Cancer Screening  Age of Menarch: 11  Period Cycle (Days): 30  Period Duration (Days): 4  Period Pattern: Regular  Menstrual Flow: Moderate  Dysmenorrhea: None  History of abnormal pap smear: None  Family History: No history of breast, cervical, ovarian, or uterine cancer    Sexual History:  Number of Partners in Last Year: 1 years  Sexually Transmitted Infection History: Chlamydia, Gonorrhea, Trichinosis  Patient requesting an STI screening    Preventative:  On osteoporosis therapy? No     Past Medical Hx:  Past Medical History:   Diagnosis Date   • Chlamydia    • Migraine        Past Surgical Hx:  Past Surgical History:   Procedure Laterality Date   •  SECTION      Low Transverse 10/24/2006       Health Maintenance:  Health Maintenance   Topic Date Due   • ANNUAL PHYSICAL  1991   • TDAP/TD VACCINES (1 - Tdap) 2007   • PAP SMEAR  08/10/2017   • INFLUENZA VACCINE  2019       Current Meds:    Current Outpatient Medications:   •  citalopram (CeleXA) 10 MG tablet, Take 1 tablet by mouth Daily for 14 days, THEN 2 tablets Daily for 14 days., Disp: 42 tablet, Rfl: 0  •  nicotine (NICODERM CQ) 7 MG/24HR patch, Place 1 patch on the skin as directed by provider Daily., Disp: 30 each, Rfl: 1  •  phenylephrine-mineral oil-petrolatum (PREPARATION H) 0.25-14-74.9 % ointment hemorrhoidal ointment, Insert 1 application into the rectum 2 (Two) Times a Day As Needed (hemorrhoids)., Disp: 56 g, Rfl: 0    Allergies:  Patient has no known allergies.    Family Hx:  Family History   Problem Relation Age of Onset   • Diabetes Mother         Social History:  Social History     Socioeconomic History   • Marital status: Single     Spouse name: Not on file   • Number of children: Not on file   • Years of education: Not on file   • Highest education level: Not on file   Tobacco Use   • Smoking  "status: Former Smoker     Packs/day: 0.50     Years: 20.00     Pack years: 10.00     Types: Cigarettes     Last attempt to quit: 2018     Years since quittin.0   • Smokeless tobacco: Current User   Substance and Sexual Activity   • Alcohol use: Yes     Comment: weekends    • Drug use: No   • Sexual activity: Yes     Partners: Male       Review of Systems  Review of Systems   Constitutional: Negative for chills, diaphoresis and fever.   HENT: Negative for congestion, rhinorrhea, sneezing and sore throat.    Eyes: Negative for discharge and redness.   Respiratory: Negative for cough, shortness of breath and wheezing.    Cardiovascular: Negative for chest pain and leg swelling.   Gastrointestinal: Negative for abdominal pain, diarrhea, nausea, rectal pain and vomiting.   Genitourinary: Negative for difficulty urinating, dysuria, genital sores and hematuria.   Musculoskeletal: Negative for gait problem and joint swelling.   Skin: Negative for rash and wound.   Neurological: Negative for seizures, syncope, light-headedness and headaches.   Psychiatric/Behavioral: Positive for sleep disturbance. Negative for behavioral problems and confusion. The patient is nervous/anxious (improving).        Objective:     /76   Pulse 81   Ht 157.5 cm (62\")   Wt 62.8 kg (138 lb 7 oz)   LMP 2019   SpO2 99%   BMI 25.32 kg/m²     Physical Exam   Constitutional: She is oriented to person, place, and time. She appears well-developed and well-nourished. No distress.   HENT:   Head: Normocephalic and atraumatic.   Nose: Nose normal.   Mouth/Throat: Oropharynx is clear and moist.   Eyes: Conjunctivae and EOM are normal. Pupils are equal, round, and reactive to light. No scleral icterus.   Cardiovascular: Normal rate, regular rhythm, normal heart sounds and intact distal pulses.   Pulmonary/Chest: Effort normal and breath sounds normal.   Abdominal: Soft. Bowel sounds are normal. There is no tenderness.   Genitourinary: " Vagina normal, uterus normal and cervix normal. Pelvic exam was performed with patient supine. There is no rash on the right labia. There is no rash on the left labia. Right adnexum displays no mass. Left adnexum displays no mass. No vaginal discharge found.   Genitourinary Comments: Exam chaperoned by Chris Jaramillo MA   Musculoskeletal: She exhibits no edema or deformity.   Neurological: She is alert and oriented to person, place, and time.   Skin: Skin is warm and dry. Capillary refill takes less than 2 seconds. No rash noted. She is not diaphoretic.   Psychiatric: She has a normal mood and affect. Her behavior is normal. Judgment and thought content normal.   Vitals reviewed.      Lab Results   Component Value Date    WBC 7.33 03/15/2018    HGB 12.5 03/15/2018    HCT 36.1 03/15/2018    MCV 87.8 03/15/2018     03/15/2018     Lab Results   Component Value Date    GLUCOSE 98 07/10/2015    BUN 6 (L) 07/10/2015    CREATININE 0.6 07/10/2015    K 4.2 07/10/2015    CO2 26 07/10/2015    CALCIUM 9.2 07/10/2015        Assessment/Plan:     Elal was seen today for gynecologic exam.    Diagnoses and all orders for this visit:    Encounter for screening for human papillomavirus (HPV)  -     Liquid-based Pap Smear, Screening    Routine screening for STI (sexually transmitted infection)  -     Chlamydia / GC, DNA Probe - Swab, Cervix, Vagina; Future  -     Gardnerella vaginalis, Trichomonas vaginalis, Candida albicans, DNA - Swab, Vagina  -     RPR; Future  -     HIV-1 & HIV-2 Antibodies  -     HSVIgM+HSV1/HSV2(IgG); Future  -     Cancel: Chlamydia / GC, DNA Probe - Swab, Cervix, Vagina  -     HIV-1 / O / 2 Ag / Antibody 4th Generation    Encouraged safe sex practices.    Follow-up:     Return in about 6 weeks (around 8/2/2019) for Recheck anxiety.    Goals        Patient Stated    • Pain relief (pt-stated)      Patient evaluated for external hemorrhoids.  Barriers to goal: None            Preventative:  -Patient's Body  mass index is 25.32 kg/m². BMI is above normal parameters. Recommendations include: exercise counseling and nutrition counseling.    -Screening pap smear: up to date.     Vaccines:    There is no immunization history on file for this patient.  Records requested.    Tetanus vaccine: not up to date. Records requested.   Annual influenza vaccine: not up to date. Will address during influenza season.  Pneumococcal vaccine: not up to date. Will address at next visit.  Hep B vaccine: not up to date. Records requested.       RISK SCORE: 3    Signature  Tammi Martinez MD  [facility] Resident, PGY III        This document has been electronically signed by Tammi Martinez MD on June 24, 2019 4:17 AM

## 2019-06-24 NOTE — TELEPHONE ENCOUNTER
Called patient left message to return our call. Call was to notify of negative vag swab result for both.     ROB Moore MA.    6/24/19

## 2019-06-24 NOTE — TELEPHONE ENCOUNTER
Called patient, left message to return our call. Call was to notify patient of negative vag swab results.     ROB Moore MA.    6/24/19

## 2019-06-25 LAB
GEN CATEG CVX/VAG CYTO-IMP: NORMAL
LAB AP CASE REPORT: NORMAL
LAB AP GYN ADDITIONAL INFORMATION: NORMAL
PATH INTERP SPEC-IMP: NORMAL
STAT OF ADQ CVX/VAG CYTO-IMP: NORMAL

## 2019-06-26 ENCOUNTER — TELEPHONE (OUTPATIENT)
Dept: FAMILY MEDICINE CLINIC | Facility: CLINIC | Age: 29
End: 2019-06-26

## 2019-06-26 NOTE — TELEPHONE ENCOUNTER
Attempted to call patient to relay message from Dr. Martinez about lab results:    Tammi Martinez MD White, Paty Iglesias MA             Pap smear negative for malignancy. If HPV is negative too, will repeat screening in 5 years. Otherwise repeat screening in 3 years. Please call patient with negative Pap findings and screening rules.        Left message for patient to please return our call.    ROB Moore MA.    6/26/19

## 2019-06-27 NOTE — PROGRESS NOTES
I have reviewed the notes, assessments, and/or procedures performed by Tammi Martinez MD during office visit. I concur with her/his documentation and assessment and plan for Ella Suazo.          This document has been electronically signed by Pawel Maddox MD on June 27, 2019 11:26 AM

## 2019-06-28 LAB — HPV I/H RISK 4 DNA CVX QL PROBE+SIG AMP: NEGATIVE

## 2019-07-01 DIAGNOSIS — F41.1 GENERALIZED ANXIETY DISORDER: ICD-10-CM

## 2019-07-02 ENCOUNTER — TELEPHONE (OUTPATIENT)
Dept: FAMILY MEDICINE CLINIC | Facility: CLINIC | Age: 29
End: 2019-07-02

## 2019-07-02 NOTE — TELEPHONE ENCOUNTER
Pt requested refill of Celexa, but it is not on medication list. Her PCP Dr. Martinez ordered medication and it picked up 6/2/19.

## 2019-07-08 RX ORDER — CITALOPRAM 10 MG/1
TABLET ORAL
Qty: 42 TABLET | Refills: 0 | OUTPATIENT
Start: 2019-07-08 | End: 2019-08-05

## 2019-11-12 ENCOUNTER — LAB (OUTPATIENT)
Dept: LAB | Facility: HOSPITAL | Age: 29
End: 2019-11-12

## 2019-11-12 DIAGNOSIS — Z32.01 POSITIVE PREGNANCY TEST: ICD-10-CM

## 2019-11-12 DIAGNOSIS — Z32.01 POSITIVE PREGNANCY TEST: Primary | ICD-10-CM

## 2019-11-12 LAB — HCG INTACT+B SERPL-ACNC: 1037 MIU/ML

## 2019-11-12 PROCEDURE — 84702 CHORIONIC GONADOTROPIN TEST: CPT

## 2019-11-12 PROCEDURE — 36415 COLL VENOUS BLD VENIPUNCTURE: CPT

## 2019-12-11 ENCOUNTER — INITIAL PRENATAL (OUTPATIENT)
Dept: OBSTETRICS AND GYNECOLOGY | Facility: CLINIC | Age: 29
End: 2019-12-11

## 2019-12-11 ENCOUNTER — APPOINTMENT (OUTPATIENT)
Dept: LAB | Facility: HOSPITAL | Age: 29
End: 2019-12-11

## 2019-12-11 VITALS — BODY MASS INDEX: 26.34 KG/M2 | SYSTOLIC BLOOD PRESSURE: 108 MMHG | DIASTOLIC BLOOD PRESSURE: 64 MMHG | WEIGHT: 144 LBS

## 2019-12-11 DIAGNOSIS — Z98.891 HISTORY OF C-SECTION: ICD-10-CM

## 2019-12-11 DIAGNOSIS — Z98.891 HISTORY OF PRIMARY CESAREAN SECTION: ICD-10-CM

## 2019-12-11 DIAGNOSIS — Z32.01 POSITIVE PREGNANCY TEST: Primary | ICD-10-CM

## 2019-12-11 DIAGNOSIS — Z34.80 SUPERVISION OF OTHER NORMAL PREGNANCY: Primary | ICD-10-CM

## 2019-12-11 DIAGNOSIS — Z87.59 HISTORY OF MISCARRIAGE: ICD-10-CM

## 2019-12-11 DIAGNOSIS — Z36.87 ENCOUNTER FOR ANTENATAL SCREENING FOR UNCERTAIN DATES: ICD-10-CM

## 2019-12-11 DIAGNOSIS — Z64.1 MULTIGRAVIDA: ICD-10-CM

## 2019-12-11 DIAGNOSIS — Z83.3 FAMILY HISTORY OF DIABETES MELLITUS: ICD-10-CM

## 2019-12-11 PROBLEM — O99.330 TOBACCO USE DURING PREGNANCY, ANTEPARTUM: Status: ACTIVE | Noted: 2019-12-11

## 2019-12-11 PROBLEM — Z72.0 TOBACCO USE: Status: RESOLVED | Noted: 2019-05-30 | Resolved: 2019-12-11

## 2019-12-11 PROBLEM — O99.330 TOBACCO USE DURING PREGNANCY, ANTEPARTUM: Status: RESOLVED | Noted: 2019-12-11 | Resolved: 2019-12-11

## 2019-12-11 LAB
ABO GROUP BLD: NORMAL
AMPHET+METHAMPHET UR QL: NEGATIVE
AMPHETAMINES UR QL: NEGATIVE
BARBITURATES UR QL SCN: NEGATIVE
BASOPHILS # BLD AUTO: 0.03 10*3/MM3 (ref 0–0.2)
BASOPHILS NFR BLD AUTO: 0.4 % (ref 0–1.5)
BENZODIAZ UR QL SCN: NEGATIVE
BILIRUB UR QL STRIP: NEGATIVE
BLD GP AB SCN SERPL QL: NEGATIVE
BUPRENORPHINE SERPL-MCNC: NEGATIVE NG/ML
CANNABINOIDS SERPL QL: POSITIVE
CLARITY UR: ABNORMAL
COCAINE UR QL: NEGATIVE
COLOR UR: YELLOW
DEPRECATED RDW RBC AUTO: 37.2 FL (ref 37–54)
EOSINOPHIL # BLD AUTO: 0.02 10*3/MM3 (ref 0–0.4)
EOSINOPHIL NFR BLD AUTO: 0.3 % (ref 0.3–6.2)
ERYTHROCYTE [DISTWIDTH] IN BLOOD BY AUTOMATED COUNT: 11.8 % (ref 12.3–15.4)
GLUCOSE UR STRIP-MCNC: NEGATIVE MG/DL
HBV SURFACE AG SERPL QL IA: NORMAL
HCT VFR BLD AUTO: 35.5 % (ref 34–46.6)
HCV AB SER DONR QL: NORMAL
HGB BLD-MCNC: 12 G/DL (ref 12–15.9)
HGB UR QL STRIP.AUTO: NEGATIVE
HIV1+2 AB SER QL: NORMAL
IMM GRANULOCYTES # BLD AUTO: 0.04 10*3/MM3 (ref 0–0.05)
IMM GRANULOCYTES NFR BLD AUTO: 0.6 % (ref 0–0.5)
KETONES UR QL STRIP: ABNORMAL
LEUKOCYTE ESTERASE UR QL STRIP.AUTO: ABNORMAL
LYMPHOCYTES # BLD AUTO: 1.09 10*3/MM3 (ref 0.7–3.1)
LYMPHOCYTES NFR BLD AUTO: 15.1 % (ref 19.6–45.3)
Lab: NORMAL
MCH RBC QN AUTO: 29.2 PG (ref 26.6–33)
MCHC RBC AUTO-ENTMCNC: 33.8 G/DL (ref 31.5–35.7)
MCV RBC AUTO: 86.4 FL (ref 79–97)
METHADONE UR QL SCN: NEGATIVE
MONOCYTES # BLD AUTO: 0.51 10*3/MM3 (ref 0.1–0.9)
MONOCYTES NFR BLD AUTO: 7.1 % (ref 5–12)
NEUTROPHILS # BLD AUTO: 5.52 10*3/MM3 (ref 1.7–7)
NEUTROPHILS NFR BLD AUTO: 76.5 % (ref 42.7–76)
NITRITE UR QL STRIP: NEGATIVE
NRBC BLD AUTO-RTO: 0 /100 WBC (ref 0–0.2)
OPIATES UR QL: NEGATIVE
OXYCODONE UR QL SCN: NEGATIVE
PCP UR QL SCN: NEGATIVE
PH UR STRIP.AUTO: 7.5 [PH] (ref 5–8)
PLATELET # BLD AUTO: 295 10*3/MM3 (ref 140–450)
PMV BLD AUTO: 10.9 FL (ref 6–12)
PROPOXYPH UR QL: NEGATIVE
PROT UR QL STRIP: ABNORMAL
RBC # BLD AUTO: 4.11 10*6/MM3 (ref 3.77–5.28)
RH BLD: POSITIVE
RPR SER QL: NORMAL
RUBV IGG SERPL IA-ACNC: POSITIVE
SP GR UR STRIP: 1.03 (ref 1–1.03)
TRICYCLICS UR QL SCN: NEGATIVE
UROBILINOGEN UR QL STRIP: ABNORMAL
WBC NRBC COR # BLD: 7.21 10*3/MM3 (ref 3.4–10.8)

## 2019-12-11 PROCEDURE — 85660 RBC SICKLE CELL TEST: CPT | Performed by: NURSE PRACTITIONER

## 2019-12-11 PROCEDURE — 36415 COLL VENOUS BLD VENIPUNCTURE: CPT

## 2019-12-11 PROCEDURE — 99213 OFFICE O/P EST LOW 20 MIN: CPT | Performed by: NURSE PRACTITIONER

## 2019-12-11 PROCEDURE — 80307 DRUG TEST PRSMV CHEM ANLYZR: CPT | Performed by: NURSE PRACTITIONER

## 2019-12-11 PROCEDURE — 81003 URINALYSIS AUTO W/O SCOPE: CPT | Performed by: NURSE PRACTITIONER

## 2019-12-11 PROCEDURE — 87086 URINE CULTURE/COLONY COUNT: CPT | Performed by: NURSE PRACTITIONER

## 2019-12-11 PROCEDURE — 86803 HEPATITIS C AB TEST: CPT | Performed by: NURSE PRACTITIONER

## 2019-12-11 PROCEDURE — 87661 TRICHOMONAS VAGINALIS AMPLIF: CPT | Performed by: NURSE PRACTITIONER

## 2019-12-11 PROCEDURE — 83021 HEMOGLOBIN CHROMOTOGRAPHY: CPT | Performed by: NURSE PRACTITIONER

## 2019-12-11 PROCEDURE — 87491 CHLMYD TRACH DNA AMP PROBE: CPT | Performed by: NURSE PRACTITIONER

## 2019-12-11 PROCEDURE — 87591 N.GONORRHOEAE DNA AMP PROB: CPT | Performed by: NURSE PRACTITIONER

## 2019-12-11 PROCEDURE — 80081 OBSTETRIC PANEL INC HIV TSTG: CPT | Performed by: NURSE PRACTITIONER

## 2019-12-11 NOTE — PROGRESS NOTES
I spent approximately 60 minutes with the patient acquiring the health and history intake and discussing topics related to healthy lifestyle. This is her 3rd pregnancy. She had a miscarriage in 2018. Csection was done in . She wants a . Her LMP is 10/5/19.  A newob bag is given. The 1st trimester teaching was done with the patient. We discussed a healthy diet and exercise and what is recommended. I also discussed Listeriosis and Toxoplasmosis and what fish to avoid due to high mercury levels. Informed patient not to be in hot tubs, saunas, or tanning beds. We discussed that spotting may occur after intercourse which is common, but if heavy bleeding like a period occurs to call [facility] or hospital if clinic is closed.  I encouraged her to make an appointment with the dentist if she has not had a dental exam and cleaning in the last 6 months. The patient denies use of alchohol, illicit drug use, and tobacco smoking. We discussed the hospital policy procedure if a patient has a positive urine drug screen at the time of admission to the hospital. She plans to breastfeed. I gave her pamphlet on breastfeeding classes and the breastfeeding mothers support group that is provided by [employer], Lactation Consultant. She filled out a health department referral form. I informed patient that group prenatal education classes are offered here. I instructed patient to let the provider know if she would like to join a group after EDC is established. She has done [facility] Pregnancy before when she was pregnant with her daughter.  I discussed with the patient that a pediatrician needs to be chosen prior to delivery for the infant to have an appointment scheduled before leaving the hospital.   I discussed lab tests will be done today. Due to family history of diabetes, an early 1hr glucola is ordered. A hemoglobin electrophoresis is also ordered. The patient is wanting to have genetic testing done and find out the  gender of the baby. I told her to discuss this with the provider today. I encouraged the patient to get the TDAP vaccine in the 3rd trimester. I told the patient that health departments are giving the TDAP vaccine to family members. All questions were answered at this time.

## 2019-12-11 NOTE — PROGRESS NOTES
CC: Initial Prenatal visit    Ella Acuna is a 29 y.o.  presents to establish prenatal care with no complaints. She denies any current medication conditions.  No family history of genetic issues, patient desires NIPT DS and gender.  Last pap smear: 2016 NIL    Past Medical History:   Diagnosis Date   • Chlamydia    • Migraine    • Urogenital trichomoniasis      Past Surgical History:   Procedure Laterality Date   •  SECTION      Low Transverse 10/24/2006       Social History     Socioeconomic History   • Marital status: Single     Spouse name: Not on file   • Number of children: Not on file   • Years of education: Not on file   • Highest education level: Not on file   Tobacco Use   • Smoking status: Former Smoker     Packs/day: 0.50     Years: 20.00     Pack years: 10.00     Types: Cigarettes     Last attempt to quit: 2018     Years since quittin.5   • Smokeless tobacco: Never Used   Substance and Sexual Activity   • Alcohol use: Not Currently     Comment: weekends    • Drug use: No   • Sexual activity: Yes     Partners: Male     Comment: last pap smear 19 negative        OB History    Para Term  AB Living   3 1 1   1 1   SAB TAB Ectopic Molar Multiple Live Births   1         1      # Outcome Date GA Lbr Shekhar/2nd Weight Sex Delivery Anes PTL Lv   3 Current            2 SAB 2018              Birth Comments: approximately 9-11 wks gestation   1 Term 10/24/06 41w1d  3260 g (7 lb 3 oz) F CS-LTranv EPI  MAVERICK      Complications: Chorioamnionitis, Meconium in amniotic fluid, Failed induction of labor, Arrested active phase of labor, Cephalopelvic disproportion     ROS: Pt denies cramping, dysuria, or vaginal bleeding.      PE: See NOB physical in episode tab, PE non-remarkable             Problems (from 19 to present)     Problem Noted Resolved    Tobacco use during pregnancy, antepartum 2019 by Rosario Perez APRN No    Multigravida  2019 by Rosario Perez APRN No          A/P: Ella Acuna is a 29 y.o.  at Unknown.  - RTC in 2 weeks for NELI appt and dating scan      Diagnosis Plan   1. Positive pregnancy test     2. Encounter for  screening for uncertain dates  US Ob Transvaginal   3. Multigravida     4. History of primary  section  Desires TOLAC

## 2019-12-12 LAB
BACTERIA SPEC AEROBE CULT: ABNORMAL
C TRACH RRNA CVX QL NAA+PROBE: NEGATIVE
HGB A MFR BLD: 97.5 % (ref 96.4–98.8)
HGB A2 MFR BLD COLUMN CHROM: 2.5 % (ref 1.8–3.2)
HGB C MFR BLD: 0 %
HGB F MFR BLD: 0 % (ref 0–2)
HGB FRACT BLD-IMP: NORMAL
HGB S BLD QL SOLY: NEGATIVE
HGB S MFR BLD: 0 %
N GONORRHOEA RRNA SPEC QL NAA+PROBE: NEGATIVE
TRICHOMONAS VAGINALIS PCR: NEGATIVE

## 2019-12-13 RX ORDER — AMPICILLIN 500 MG/1
500 CAPSULE ORAL 3 TIMES DAILY
Qty: 9 CAPSULE | Refills: 0 | Status: SHIPPED | OUTPATIENT
Start: 2019-12-13 | End: 2019-12-16

## 2019-12-20 ENCOUNTER — APPOINTMENT (OUTPATIENT)
Dept: LAB | Facility: HOSPITAL | Age: 29
End: 2019-12-20

## 2019-12-20 ENCOUNTER — ROUTINE PRENATAL (OUTPATIENT)
Dept: OBSTETRICS AND GYNECOLOGY | Facility: CLINIC | Age: 29
End: 2019-12-20

## 2019-12-20 VITALS — DIASTOLIC BLOOD PRESSURE: 68 MMHG | BODY MASS INDEX: 27.07 KG/M2 | WEIGHT: 148 LBS | SYSTOLIC BLOOD PRESSURE: 106 MMHG

## 2019-12-20 DIAGNOSIS — Z34.80 SUPERVISION OF OTHER NORMAL PREGNANCY: ICD-10-CM

## 2019-12-20 DIAGNOSIS — Z23 INFLUENZA VACCINE NEEDED: ICD-10-CM

## 2019-12-20 DIAGNOSIS — Z98.891 HISTORY OF PRIMARY CESAREAN SECTION: ICD-10-CM

## 2019-12-20 DIAGNOSIS — Z3A.10 10 WEEKS GESTATION OF PREGNANCY: Primary | ICD-10-CM

## 2019-12-20 DIAGNOSIS — Z83.3 FAMILY HISTORY OF DIABETES MELLITUS: ICD-10-CM

## 2019-12-20 DIAGNOSIS — N30.01 ACUTE CYSTITIS WITH HEMATURIA: ICD-10-CM

## 2019-12-20 DIAGNOSIS — Z64.1 MULTIGRAVIDA: ICD-10-CM

## 2019-12-20 LAB — GLUCOSE 1H P 100 G GLC PO SERPL-MCNC: 145 MG/DL (ref 60–140)

## 2019-12-20 PROCEDURE — 90471 IMMUNIZATION ADMIN: CPT | Performed by: NURSE PRACTITIONER

## 2019-12-20 PROCEDURE — 82950 GLUCOSE TEST: CPT | Performed by: NURSE PRACTITIONER

## 2019-12-20 PROCEDURE — 36415 COLL VENOUS BLD VENIPUNCTURE: CPT | Performed by: NURSE PRACTITIONER

## 2019-12-20 PROCEDURE — 90686 IIV4 VACC NO PRSV 0.5 ML IM: CPT | Performed by: NURSE PRACTITIONER

## 2019-12-20 PROCEDURE — 99213 OFFICE O/P EST LOW 20 MIN: CPT | Performed by: NURSE PRACTITIONER

## 2019-12-20 NOTE — PROGRESS NOTES
CC: Prenatal visit    Ella Acuna is a 29 y.o.  at 10w6d.  Doing well.  No complaints.  Denies cramping, dysuria, or vaginal bleeding.      /68   Wt 67.1 kg (148 lb)   LMP 10/05/2019   BMI 27.07 kg/m²     Reviewed preliminary TVUS report with pt- single intrauterine pregnancy, CRL 10w5d c/w LMP, right ovary corpus luteal cyst, left ovary wnl           Problems (from 19 to present)     Problem Noted Resolved    Multigravida 2019 by Rosario Perez, THAO No    History of primary  section 2019 by Rosario Perez APRN No    Tobacco use during pregnancy, antepartum 2019 by Rosario Perez, THAO 2019 by Rosario Perez APRN          A/P: Ella Acuna is a 29 y.o.  at 10w6d.  Reviewed NOB labs- +THC, UTI  FLU VACCINE GIVEN  Panorama DS with gender and 1 hour OGTT done     - RTC in 4 weeks for NELI appt or sooner if needed      Diagnosis Plan   1. 10 weeks gestation of pregnancy     2. Multigravida     3. History of primary  section  Desires TOLAC   4. Supervision of other normal pregnancy  Glucose, Post 50 Gm Glucola   5. Family history of diabetes mellitus  Glucose, Post 50 Gm Glucola   6.       Acute cystitis       U/a with culture at next visit     THAO Felipe  2019  8:49 AM

## 2019-12-26 ENCOUNTER — LAB (OUTPATIENT)
Dept: LAB | Facility: HOSPITAL | Age: 29
End: 2019-12-26

## 2019-12-26 ENCOUNTER — APPOINTMENT (OUTPATIENT)
Dept: LAB | Facility: HOSPITAL | Age: 29
End: 2019-12-26

## 2019-12-26 DIAGNOSIS — Z13.1 SCREENING FOR DIABETES MELLITUS: Primary | ICD-10-CM

## 2019-12-26 DIAGNOSIS — Z13.1 SCREENING FOR DIABETES MELLITUS (DM): Primary | ICD-10-CM

## 2019-12-26 LAB
GLUCOSE 1H P 100 G GLC PO SERPL-MCNC: 161 MG/DL (ref 74–180)
GLUCOSE 2H P 100 G GLC PO SERPL-MCNC: 105 MG/DL (ref 74–155)
GLUCOSE 3H P 100 G GLC PO SERPL-MCNC: 93 MG/DL (ref 74–140)
GLUCOSE P FAST SERPL-MCNC: 96 MG/DL (ref 65–99)

## 2019-12-26 PROCEDURE — 82951 GLUCOSE TOLERANCE TEST (GTT): CPT

## 2019-12-26 PROCEDURE — 36415 COLL VENOUS BLD VENIPUNCTURE: CPT

## 2019-12-26 PROCEDURE — 82952 GTT-ADDED SAMPLES: CPT

## 2019-12-27 ENCOUNTER — TELEPHONE (OUTPATIENT)
Dept: OBSTETRICS AND GYNECOLOGY | Facility: CLINIC | Age: 29
End: 2019-12-27

## 2019-12-27 NOTE — TELEPHONE ENCOUNTER
----- Message from THAO Knox sent at 12/27/2019  8:21 AM CST -----  Passed 3 hour ogtt  ----- Message -----  From: Lab, Background User  Sent: 12/26/2019   9:05 AM CST  To: THAO Knox

## 2020-01-14 ENCOUNTER — RESULTS ENCOUNTER (OUTPATIENT)
Dept: OBSTETRICS AND GYNECOLOGY | Facility: CLINIC | Age: 30
End: 2020-01-14

## 2020-01-14 DIAGNOSIS — Z13.79 GENETIC TESTING: ICD-10-CM

## 2020-01-14 DIAGNOSIS — Z13.79 GENETIC TESTING: Primary | ICD-10-CM

## 2020-01-17 ENCOUNTER — ROUTINE PRENATAL (OUTPATIENT)
Dept: OBSTETRICS AND GYNECOLOGY | Facility: CLINIC | Age: 30
End: 2020-01-17

## 2020-01-17 ENCOUNTER — APPOINTMENT (OUTPATIENT)
Dept: LAB | Facility: HOSPITAL | Age: 30
End: 2020-01-17

## 2020-01-17 VITALS — DIASTOLIC BLOOD PRESSURE: 62 MMHG | BODY MASS INDEX: 27.62 KG/M2 | SYSTOLIC BLOOD PRESSURE: 102 MMHG | WEIGHT: 151 LBS

## 2020-01-17 DIAGNOSIS — Z3A.14 14 WEEKS GESTATION OF PREGNANCY: Primary | ICD-10-CM

## 2020-01-17 DIAGNOSIS — Z98.891 HISTORY OF PRIMARY CESAREAN SECTION: ICD-10-CM

## 2020-01-17 DIAGNOSIS — O23.42 URINARY TRACT INFECTION IN MOTHER DURING SECOND TRIMESTER OF PREGNANCY: ICD-10-CM

## 2020-01-17 DIAGNOSIS — Z64.1 MULTIGRAVIDA: ICD-10-CM

## 2020-01-17 DIAGNOSIS — F12.10 TETRAHYDROCANNABINOL (THC) USE DISORDER, MILD, ABUSE: ICD-10-CM

## 2020-01-17 DIAGNOSIS — Z36.89 ENCOUNTER FOR FETAL ANATOMIC SURVEY: ICD-10-CM

## 2020-01-17 LAB
AMPHET+METHAMPHET UR QL: NEGATIVE
AMPHETAMINES UR QL: NEGATIVE
BACTERIA UR QL AUTO: ABNORMAL /HPF
BARBITURATES UR QL SCN: NEGATIVE
BENZODIAZ UR QL SCN: NEGATIVE
BILIRUB UR QL STRIP: NEGATIVE
BUPRENORPHINE SERPL-MCNC: NEGATIVE NG/ML
CANNABINOIDS SERPL QL: POSITIVE
CLARITY UR: ABNORMAL
COCAINE UR QL: NEGATIVE
COD CRY URNS QL: ABNORMAL /HPF
COLOR UR: YELLOW
GLUCOSE UR STRIP-MCNC: NEGATIVE MG/DL
HGB UR QL STRIP.AUTO: NEGATIVE
HYALINE CASTS UR QL AUTO: ABNORMAL /LPF
KETONES UR QL STRIP: NEGATIVE
LEUKOCYTE ESTERASE UR QL STRIP.AUTO: NEGATIVE
METHADONE UR QL SCN: NEGATIVE
NITRITE UR QL STRIP: NEGATIVE
OPIATES UR QL: NEGATIVE
OXYCODONE UR QL SCN: NEGATIVE
PCP UR QL SCN: NEGATIVE
PH UR STRIP.AUTO: 6.5 [PH] (ref 5–8)
PROPOXYPH UR QL: NEGATIVE
PROT UR QL STRIP: ABNORMAL
RBC # UR: ABNORMAL /HPF
REF LAB TEST METHOD: ABNORMAL
SP GR UR STRIP: 1.03 (ref 1–1.03)
SQUAMOUS #/AREA URNS HPF: ABNORMAL /HPF
TRICYCLICS UR QL SCN: NEGATIVE
UROBILINOGEN UR QL STRIP: ABNORMAL
WBC UR QL AUTO: ABNORMAL /HPF

## 2020-01-17 PROCEDURE — 87086 URINE CULTURE/COLONY COUNT: CPT | Performed by: NURSE PRACTITIONER

## 2020-01-17 PROCEDURE — 81001 URINALYSIS AUTO W/SCOPE: CPT | Performed by: NURSE PRACTITIONER

## 2020-01-17 PROCEDURE — 80306 DRUG TEST PRSMV INSTRMNT: CPT | Performed by: NURSE PRACTITIONER

## 2020-01-17 PROCEDURE — 99213 OFFICE O/P EST LOW 20 MIN: CPT | Performed by: NURSE PRACTITIONER

## 2020-01-17 NOTE — PROGRESS NOTES
CC: Prenatal visit    Ella Jeffries is a 29 y.o.  at 14w6d.  Doing well.  No complaints.  Denies contractions, LOF, or VB.  Reports good FM.    /62   Wt 68.5 kg (151 lb)   LMP 10/05/2019   BMI 27.62 kg/m²        Fetal Heart Rate: 145     Problems (from 19 to present)     Problem Noted Resolved    Multigravida 2019 by Rosario Perez APRN No    History of primary  section 2019 by Rosario Perez APRN No    Tobacco use during pregnancy, antepartum 2019 by Rosario Perez APRN 2019 by Rosario Perez APRN          A/P: Ella Jeffries is a 29 y.o.  at 14w6d.  - RTC in 4 weeks for NELI appt and anatomy scan or sooner if needed      Diagnosis Plan   1. 14 weeks gestation of pregnancy     2. Multigravida     3. History of primary  section     4. Urinary tract infection in mother during second trimester of pregnancy  Urinalysis With Microscopic - Urine, Clean Catch    Urine Culture - , Urine, Clean Catch   5. Tetrahydrocannabinol (THC) use disorder, mild, abuse  Urine Drug Screen - Urine, Clean Catch   6. Encounter for fetal anatomic survey  US Ob Detail Fetal Anatomy Single or First Gestation       THAO Felipe  2020  9:08 AM

## 2020-01-18 LAB — BACTERIA SPEC AEROBE CULT: NORMAL

## 2020-02-13 ENCOUNTER — ROUTINE PRENATAL (OUTPATIENT)
Dept: OBSTETRICS AND GYNECOLOGY | Facility: CLINIC | Age: 30
End: 2020-02-13

## 2020-02-13 VITALS — DIASTOLIC BLOOD PRESSURE: 72 MMHG | BODY MASS INDEX: 28.72 KG/M2 | WEIGHT: 157 LBS | SYSTOLIC BLOOD PRESSURE: 116 MMHG

## 2020-02-13 DIAGNOSIS — F12.10 TETRAHYDROCANNABINOL (THC) USE DISORDER, MILD, ABUSE: ICD-10-CM

## 2020-02-13 DIAGNOSIS — Z3A.18 18 WEEKS GESTATION OF PREGNANCY: Primary | ICD-10-CM

## 2020-02-13 DIAGNOSIS — IMO0002 EVALUATE ANATOMY NOT SEEN ON PRIOR SONOGRAM: ICD-10-CM

## 2020-02-13 DIAGNOSIS — Z98.891 HISTORY OF PRIMARY CESAREAN SECTION: ICD-10-CM

## 2020-02-13 DIAGNOSIS — Z64.1 MULTIGRAVIDA: ICD-10-CM

## 2020-02-13 PROCEDURE — 99213 OFFICE O/P EST LOW 20 MIN: CPT | Performed by: NURSE PRACTITIONER

## 2020-02-13 NOTE — PROGRESS NOTES
CC: Prenatal visit    Ella Jeffries is a 29 y.o.  at 18w5d.  Doing well.  No complaints.  Denies contractions, LOF, or VB.  Reports good FM.    LMP 10/05/2019     Reviewed prelim anatomy scan report with pt and s/o- fetus in breech presentation, placenta anterior, no previa, AFV WNL, EFW 9 oz, all anatomy seen had normal appearance, suboptimal views of spine and AA remain, 3vc, its a BOY!, CL 4.2cm, uterus wnl, bilateral ovaries wnl           Problems (from 19 to present)     Problem Noted Resolved    Multigravida 2019 by Rosario Perez, THAO No    History of primary  section 2019 by Rosario Perez, THAO No    Tobacco use during pregnancy, antepartum 2019 by Rosario Perez, THAO 2019 by Rosario Perez APRN          A/P: Ella Jeffries is a 29 y.o.  at 18w5d.  - RTC on 2020 for centering then 4 weeks for NELI appt and f/u TAUS for sub optimal views      Diagnosis Plan   1. 18 weeks gestation of pregnancy     2. Multigravida     3. History of primary  section  Desires TOLAC    4.      Anatomy not seen on prior sonogram    THAO Felipe  2020  8:53 AM

## 2020-02-17 ENCOUNTER — TELEPHONE (OUTPATIENT)
Dept: OBSTETRICS AND GYNECOLOGY | Facility: CLINIC | Age: 30
End: 2020-02-17

## 2020-02-24 DIAGNOSIS — Z36.89 ENCOUNTER FOR FETAL ANATOMIC SURVEY: ICD-10-CM

## 2020-03-10 ENCOUNTER — ROUTINE PRENATAL (OUTPATIENT)
Dept: OBSTETRICS AND GYNECOLOGY | Facility: CLINIC | Age: 30
End: 2020-03-10

## 2020-03-10 VITALS — BODY MASS INDEX: 30.11 KG/M2 | WEIGHT: 164.6 LBS | SYSTOLIC BLOOD PRESSURE: 94 MMHG | DIASTOLIC BLOOD PRESSURE: 62 MMHG

## 2020-03-10 DIAGNOSIS — Z98.891 HISTORY OF PRIMARY CESAREAN SECTION: ICD-10-CM

## 2020-03-10 DIAGNOSIS — Z3A.22 22 WEEKS GESTATION OF PREGNANCY: Primary | ICD-10-CM

## 2020-03-10 DIAGNOSIS — O09.299: ICD-10-CM

## 2020-03-10 DIAGNOSIS — O09.92 SUPERVISION OF HIGH RISK PREGNANCY IN SECOND TRIMESTER: ICD-10-CM

## 2020-03-10 PROBLEM — O09.90 SUPERVISION OF HIGH-RISK PREGNANCY: Status: ACTIVE | Noted: 2020-03-10

## 2020-03-10 PROCEDURE — 99213 OFFICE O/P EST LOW 20 MIN: CPT | Performed by: FAMILY MEDICINE

## 2020-03-10 NOTE — PROGRESS NOTES
CC: Prenatal visit    Ella Jeffries is a 30 y.o.  at 22w3d.  Doing well.  No complaints.  Denies contractions, LOF, or VB.  Reports good FM.    Follow up anatomy: Cephalic, anterior placenta, FHR 135bpm, EFW 358g (1#3oz), all previously sub opt views normal on today's exam, male fetus    BP 94/62   Wt 74.7 kg (164 lb 9.6 oz)   LMP 10/05/2019   BMI 30.11 kg/m²   SVE: deferred  Fundal Height (cm): 22 cm  Fetal Heart Rate: 135     Problems (from 19 to present)     Problem Noted Resolved    Supervision of high-risk pregnancy 3/10/2020 by Kasandra Wesley MD No    Overview Signed 3/10/2020  2:45 PM by Kasandra Wesley MD     A POS/ Rubella immune/ GBS @ 36wks  Dating: LMP   Genetics: declined  Tdap: 28wks  Flu: n/a  Anatomy: cephalic, anterior placenta, BOY  1h Glucola: 28wks  Lab Results   Component Value Date    HGB 12.0 2019    HCT 35.5 2019     2019      Breast feed  BC plan: undecided           History of cephalopelvic disproportion in prior pregnancy, currently pregnant 3/10/2020 by Kasandra Wesley MD No    Overview Addendum 3/10/2020  2:55 PM by Kasandra Wesley MD      pregnancy - s/p LTCS  7#3oz         Tetrahydrocannabinol (THC) use disorder, mild, abuse 2020 by Rosario Perez, THAO No    History of primary  section 2019 by Rosario Perez APRN No    Overview Signed 3/10/2020  2:44 PM by Kasandra Wesley MD     For arrest of dilation/CPD  Op note not available; pt wanted to          Tobacco use during pregnancy, antepartum 2019 by Rosario Perez APRN 2019 by Rosario Perez APRN          A/P: Ella Carline Jeffries is a 30 y.o.  at 22w3d.  - RTC in 4 weeks with 3T labs  -Wants to  -  h/o PLTCS x1; op note not available - per available records c/s was done for arrest of active labor & CPD - baby weighed 7#3oz.   Given this  information it does not appear that patient is a candidate for TOLAC.  Recommended delivery route would be RLTCS.     Diagnosis Plan   1. 22 weeks gestation of pregnancy     2. History of primary  section     3. Supervision of high risk pregnancy in second trimester  Glucose, Post 50 Gm Glucola    CBC & Differential   4. History of cephalopelvic disproportion in prior pregnancy, currently pregnant       Signature  Kasandra Wesley MD  [facility]'s [address], [place] [address]  Office: [phone]      This document has been electronically signed by Kasandra Wesley MD on March 10, 2020 14:48

## 2020-03-12 DIAGNOSIS — IMO0002 EVALUATE ANATOMY NOT SEEN ON PRIOR SONOGRAM: ICD-10-CM

## 2020-04-07 ENCOUNTER — LAB (OUTPATIENT)
Dept: LAB | Facility: HOSPITAL | Age: 30
End: 2020-04-07

## 2020-04-07 ENCOUNTER — ROUTINE PRENATAL (OUTPATIENT)
Dept: OBSTETRICS AND GYNECOLOGY | Facility: CLINIC | Age: 30
End: 2020-04-07

## 2020-04-07 VITALS — SYSTOLIC BLOOD PRESSURE: 102 MMHG | BODY MASS INDEX: 31.79 KG/M2 | DIASTOLIC BLOOD PRESSURE: 74 MMHG | WEIGHT: 173.8 LBS

## 2020-04-07 DIAGNOSIS — O09.299: ICD-10-CM

## 2020-04-07 DIAGNOSIS — O09.92 SUPERVISION OF HIGH RISK PREGNANCY IN SECOND TRIMESTER: ICD-10-CM

## 2020-04-07 DIAGNOSIS — F12.10 TETRAHYDROCANNABINOL (THC) USE DISORDER, MILD, ABUSE: ICD-10-CM

## 2020-04-07 DIAGNOSIS — Z98.891 HISTORY OF PRIMARY CESAREAN SECTION: ICD-10-CM

## 2020-04-07 DIAGNOSIS — Z3A.26 26 WEEKS GESTATION OF PREGNANCY: Primary | ICD-10-CM

## 2020-04-07 LAB
BASOPHILS # BLD AUTO: 0.03 10*3/MM3 (ref 0–0.2)
BASOPHILS NFR BLD AUTO: 0.3 % (ref 0–1.5)
DEPRECATED RDW RBC AUTO: 41.8 FL (ref 37–54)
EOSINOPHIL # BLD AUTO: 0.06 10*3/MM3 (ref 0–0.4)
EOSINOPHIL NFR BLD AUTO: 0.7 % (ref 0.3–6.2)
ERYTHROCYTE [DISTWIDTH] IN BLOOD BY AUTOMATED COUNT: 12.6 % (ref 12.3–15.4)
GLUCOSE 1H P 100 G GLC PO SERPL-MCNC: 203 MG/DL (ref 60–140)
HCT VFR BLD AUTO: 33.3 % (ref 34–46.6)
HGB BLD-MCNC: 11.7 G/DL (ref 12–15.9)
IMM GRANULOCYTES # BLD AUTO: 0.35 10*3/MM3 (ref 0–0.05)
IMM GRANULOCYTES NFR BLD AUTO: 3.8 % (ref 0–0.5)
LYMPHOCYTES # BLD AUTO: 0.99 10*3/MM3 (ref 0.7–3.1)
LYMPHOCYTES NFR BLD AUTO: 10.9 % (ref 19.6–45.3)
MCH RBC QN AUTO: 31.9 PG (ref 26.6–33)
MCHC RBC AUTO-ENTMCNC: 35.1 G/DL (ref 31.5–35.7)
MCV RBC AUTO: 90.7 FL (ref 79–97)
MONOCYTES # BLD AUTO: 0.63 10*3/MM3 (ref 0.1–0.9)
MONOCYTES NFR BLD AUTO: 6.9 % (ref 5–12)
NEUTROPHILS # BLD AUTO: 7.04 10*3/MM3 (ref 1.7–7)
NEUTROPHILS NFR BLD AUTO: 77.4 % (ref 42.7–76)
NRBC BLD AUTO-RTO: 0.1 /100 WBC (ref 0–0.2)
PLATELET # BLD AUTO: 247 10*3/MM3 (ref 140–450)
PMV BLD AUTO: 11.7 FL (ref 6–12)
RBC # BLD AUTO: 3.67 10*6/MM3 (ref 3.77–5.28)
WBC NRBC COR # BLD: 9.1 10*3/MM3 (ref 3.4–10.8)

## 2020-04-07 PROCEDURE — 85025 COMPLETE CBC W/AUTO DIFF WBC: CPT

## 2020-04-07 PROCEDURE — 36415 COLL VENOUS BLD VENIPUNCTURE: CPT

## 2020-04-07 PROCEDURE — 82950 GLUCOSE TEST: CPT

## 2020-04-07 PROCEDURE — 99213 OFFICE O/P EST LOW 20 MIN: CPT | Performed by: FAMILY MEDICINE

## 2020-04-07 NOTE — PROGRESS NOTES
CC: Prenatal visit    Ella Jeffries is a 30 y.o.  at 26w3d.  Doing well.  No complaints.  Denies contractions, LOF, or VB.  Reports good FM. 1hr glucola in progress.    /74   Wt 78.8 kg (173 lb 12.8 oz)   LMP 10/05/2019   BMI 31.79 kg/m²   SVE: deferred  Fundal Height (cm): 26 cm  Fetal Heart Rate: 138     Problems (from 19 to present)     Problem Noted Resolved    Supervision of high-risk pregnancy 3/10/2020 by Kasandra Wesley MD No    Overview Addendum 3/10/2020  3:19 PM by Kasandra Wesley MD     A POS/ Rubella immune/ GBS @ 36wks  Dating: LMP   Genetics: declined  Tdap: 28wks  Flu: n/a  Anatomy: cephalic, anterior placenta, BOY  1h Glucola: 28wks  Lab Results   Component Value Date    HGB 12.0 2019    HCT 35.5 2019     2019      Breast feed  BC plan: undecided  Planned delivery method: RLTCS           History of cephalopelvic disproportion in prior pregnancy, currently pregnant 3/10/2020 by Kasandra Wesley MD No    Overview Addendum 3/10/2020  2:55 PM by Kasandra Wesley MD      pregnancy - s/p LTCS  7#3oz         Tetrahydrocannabinol (THC) use disorder, mild, abuse 2020 by Rosario Perez APRN No    History of primary  section 2019 by Rosario Perez APRN No    Overview Addendum 2020  9:41 AM by Kasandra Wesley MD     For arrest of dilation/CPD  Op note not available; pt wanted to  - not a candidate         Tobacco use during pregnancy, antepartum 2019 by Rosario Perez APRN 2019 by Rosario Perez APRN          A/P: Ella Jeffries is a 30 y.o.  at 26w3d.  - RTC in 4 weeks.  Will give TDAP at next visit  -FKC reviewed.  PTL precautions given.  Encouraged to drink 3-4 glasses of ice water if she experiences contractions.  If contractions occur regularly (every 5-10 minutes or less) for 1 hour and do not resolve  with drinking fluids and/or taking a warm bath, patient advised to come to L&D for evaluation.     Diagnosis Plan   1. 26 weeks gestation of pregnancy     2. Supervision of high risk pregnancy in second trimester     3. History of cephalopelvic disproportion in prior pregnancy, currently pregnant     4. Tetrahydrocannabinol (THC) use disorder, mild, abuse     5. History of primary  section         Signature  Kasandra Wesley MD  [facility]'s [address], [place] [address]  Office: [phone]      This document has been electronically signed by Kasandra Wesley MD on 2020 09:40

## 2020-04-10 ENCOUNTER — TELEPHONE (OUTPATIENT)
Dept: OBSTETRICS AND GYNECOLOGY | Facility: CLINIC | Age: 30
End: 2020-04-10

## 2020-04-10 DIAGNOSIS — R89.9 ABNORMAL LABORATORY TEST: Primary | ICD-10-CM

## 2020-04-10 NOTE — TELEPHONE ENCOUNTER
I called this patient and informed her that she has an elevated glucose result and she will need to do the 3 hour test.  I connected her to the clinic to schedule that test.

## 2020-04-10 NOTE — TELEPHONE ENCOUNTER
----- Message from Kasandra Wesley MD sent at 4/7/2020 12:41 PM CDT -----  Please inform patient that her 1hr glucola was elevated and she needs to complete 3hr OGTT.

## 2020-04-13 ENCOUNTER — TELEPHONE (OUTPATIENT)
Dept: OBSTETRICS AND GYNECOLOGY | Facility: CLINIC | Age: 30
End: 2020-04-13

## 2020-04-13 ENCOUNTER — LAB (OUTPATIENT)
Dept: LAB | Facility: HOSPITAL | Age: 30
End: 2020-04-13

## 2020-04-13 DIAGNOSIS — O24.419 GESTATIONAL DIABETES MELLITUS (GDM) IN THIRD TRIMESTER, GESTATIONAL DIABETES METHOD OF CONTROL UNSPECIFIED: Primary | ICD-10-CM

## 2020-04-13 DIAGNOSIS — R89.9 ABNORMAL LABORATORY TEST: ICD-10-CM

## 2020-04-13 LAB
GLUCOSE 1H P 100 G GLC PO SERPL-MCNC: 184 MG/DL (ref 74–180)
GLUCOSE 2H P 100 G GLC PO SERPL-MCNC: 138 MG/DL (ref 74–155)
GLUCOSE 3H P 100 G GLC PO SERPL-MCNC: 123 MG/DL (ref 74–140)
GLUCOSE P FAST SERPL-MCNC: 120 MG/DL (ref 65–99)

## 2020-04-13 PROCEDURE — 36415 COLL VENOUS BLD VENIPUNCTURE: CPT

## 2020-04-13 PROCEDURE — 82952 GTT-ADDED SAMPLES: CPT

## 2020-04-13 PROCEDURE — 82951 GLUCOSE TOLERANCE TEST (GTT): CPT

## 2020-04-13 RX ORDER — LANCETS 30 GAUGE
EACH MISCELLANEOUS
Qty: 100 EACH | Refills: 2 | Status: SHIPPED | OUTPATIENT
Start: 2020-04-13 | End: 2020-05-12 | Stop reason: SDUPTHER

## 2020-04-13 RX ORDER — PEN NEEDLE, DIABETIC 31 GX5/16"
NEEDLE, DISPOSABLE MISCELLANEOUS
Qty: 100 EACH | Refills: 2 | Status: SHIPPED | OUTPATIENT
Start: 2020-04-13 | End: 2020-06-23

## 2020-04-13 RX ORDER — BLOOD-GLUCOSE METER
KIT MISCELLANEOUS
Qty: 1 EACH | Refills: 0 | Status: SHIPPED | OUTPATIENT
Start: 2020-04-13 | End: 2020-06-23

## 2020-04-13 NOTE — TELEPHONE ENCOUNTER
----- Message from Kasandra Wesley MD sent at 4/13/2020 12:19 PM CDT -----  Patient had 2 elevated readings on 3hr OGTT which indicates GDM.  Please inform her that I have sent a glucometer and testing supplies to [facility].   I would like her to check blood sugars in the morning (fasting) and 1hr after meals.  She is to write these numbers down & bring her log to her next office visit.

## 2020-04-14 ENCOUNTER — TELEPHONE (OUTPATIENT)
Dept: OBSTETRICS AND GYNECOLOGY | Facility: CLINIC | Age: 30
End: 2020-04-14

## 2020-04-14 NOTE — TELEPHONE ENCOUNTER
----- Message from Kasandra Wesley MD sent at 4/13/2020 12:19 PM CDT -----  Patient had 2 elevated readings on 3hr OGTT which indicates GDM.  Please inform her that I have sent a glucometer and testing supplies to [facility].   I would like her to check blood sugars in the morning (fasting) and 1hr after meals.  She is to write these numbers down & bring her log to her next office visit.

## 2020-04-14 NOTE — TELEPHONE ENCOUNTER
----- Message from Kasandra Wesley MD sent at 4/13/2020 12:19 PM CDT -----  Patient had 2 elevated readings on 3hr OGTT which indicates GDM.  Please inform her that I have sent a glucometer and testing supplies to [facility].   I would like her to check blood sugars in the morning (fasting) and 1hr after meals.  She is to write these numbers down & bring her log to her next office visit.

## 2020-04-14 NOTE — TELEPHONE ENCOUNTER
This patient called back and I gave her all the information about her glucose test.  She said she understood.

## 2020-05-05 ENCOUNTER — ROUTINE PRENATAL (OUTPATIENT)
Dept: OBSTETRICS AND GYNECOLOGY | Facility: CLINIC | Age: 30
End: 2020-05-05

## 2020-05-05 VITALS — WEIGHT: 180.4 LBS | DIASTOLIC BLOOD PRESSURE: 64 MMHG | SYSTOLIC BLOOD PRESSURE: 110 MMHG | BODY MASS INDEX: 33 KG/M2

## 2020-05-05 DIAGNOSIS — O24.419 GESTATIONAL DIABETES MELLITUS (GDM) IN THIRD TRIMESTER, GESTATIONAL DIABETES METHOD OF CONTROL UNSPECIFIED: ICD-10-CM

## 2020-05-05 DIAGNOSIS — Z98.891 HISTORY OF PRIMARY CESAREAN SECTION: ICD-10-CM

## 2020-05-05 DIAGNOSIS — O09.299: ICD-10-CM

## 2020-05-05 DIAGNOSIS — O09.92 SUPERVISION OF HIGH RISK PREGNANCY IN SECOND TRIMESTER: ICD-10-CM

## 2020-05-05 DIAGNOSIS — F12.10 TETRAHYDROCANNABINOL (THC) USE DISORDER, MILD, ABUSE: ICD-10-CM

## 2020-05-05 DIAGNOSIS — Z3A.30 30 WEEKS GESTATION OF PREGNANCY: Primary | ICD-10-CM

## 2020-05-05 PROBLEM — O24.415 GESTATIONAL DIABETES MELLITUS (GDM) IN THIRD TRIMESTER CONTROLLED ON ORAL HYPOGLYCEMIC DRUG: Status: ACTIVE | Noted: 2020-04-13

## 2020-05-05 PROCEDURE — 90471 IMMUNIZATION ADMIN: CPT | Performed by: FAMILY MEDICINE

## 2020-05-05 PROCEDURE — 90715 TDAP VACCINE 7 YRS/> IM: CPT | Performed by: FAMILY MEDICINE

## 2020-05-05 PROCEDURE — 99213 OFFICE O/P EST LOW 20 MIN: CPT | Performed by: FAMILY MEDICINE

## 2020-05-05 RX ORDER — METFORMIN HYDROCHLORIDE 500 MG/1
500 TABLET, EXTENDED RELEASE ORAL NIGHTLY
Qty: 30 TABLET | Refills: 3 | Status: SHIPPED | OUTPATIENT
Start: 2020-05-05 | End: 2020-05-12

## 2020-05-05 NOTE — PROGRESS NOTES
CC: Prenatal visit    Ella Jeffries is a 30 y.o.  at 30w3d pregnancy complicated by GDM.  No complaints.  Denies contractions, LOF, or VB.  Reports good FM.    Has been checking blood glucose levels: Fastin-130s; 1hr postprandial: variable, mostly <140 with a few elevated.  Has a diet rich in milk, pasta, bread and orange juice.    /64   Wt 81.8 kg (180 lb 6.4 oz)   LMP 10/05/2019   BMI 33.00 kg/m²   SVE: deferred  Fundal Height (cm): 32 cm  Fetal Heart Rate: 135     Problems (from 19 to present)     Problem Noted Resolved    Gestational diabetes mellitus (GDM) in third trimester 2020 by Kasandra Wesley MD No    Supervision of high-risk pregnancy 3/10/2020 by Kasandra Welsey MD No    Overview Addendum 2020 12:24 PM by Kasandra Wesley MD     A POS/ Rubella immune/ GBS @ 36wks  Dating: LMP   Genetics: declined  Tdap: 28wks  Flu: n/a  Anatomy: cephalic, anterior placenta, BOY  1h Glucola: 203  3hr - 2 elevated readings  Lab Results   Component Value Date    HGB 12.0 2019    HCT 35.5 2019     2019      Breast feed  BC plan: undecided  Planned delivery method: RLTCS           History of cephalopelvic disproportion in prior pregnancy, currently pregnant 3/10/2020 by Kasandra Wesley MD No    Overview Addendum 3/10/2020  2:55 PM by Kasandra Wesley MD      pregnancy - s/p LTCS  7#3oz         Tetrahydrocannabinol (THC) use disorder, mild, abuse 2020 by Rosario Perez, THAO No    History of primary  section 2019 by Rosario Perez APRN No    Overview Addendum 2020  9:41 AM by Kasandra Wesley MD     For arrest of dilation/CPD  Op note not available; pt wanted to  - not a candidate         Tobacco use during pregnancy, antepartum 2019 by Rosario Perez APRN 2019 by Rosario Perez APRN          A/P: Ella Jeffries  is a 30 y.o.  at 30w3d.  - TDAP given  - RTC in 1 weeks  - Start metformin at night as fastings consistently above goal.  GDM diet reviewed with patient, folder with handouts reviewed and given to patient.  She will make dietary changes & start metformin.   - FKC reviewed.  PTL precautions given.  Encouraged to drink 3-4 glasses of ice water if she experiences contractions.  If contractions occur regularly (every 5-10 minutes or less) for 1 hour and do not resolve with drinking fluids and/or taking a warm bath, patient advised to come to L&D for evaluation.       Diagnosis Plan   1. 30 weeks gestation of pregnancy  Tdap Vaccine Greater Than or Equal To 6yo IM   2. Gestational diabetes mellitus (GDM) in third trimester, gestational diabetes method of control unspecified  metFORMIN ER (GLUCOPHAGE-XR) 500 MG 24 hr tablet   3. Supervision of high risk pregnancy in second trimester     4. History of cephalopelvic disproportion in prior pregnancy, currently pregnant     5. Tetrahydrocannabinol (THC) use disorder, mild, abuse     6. History of primary  section         Signature  Kasandra Wesley MD  [facility]  [address] [place] [address]  Office: [phone]      This document has been electronically signed by Kasandra Wesley MD on May 5, 2020 09:34

## 2020-05-12 ENCOUNTER — ROUTINE PRENATAL (OUTPATIENT)
Dept: OBSTETRICS AND GYNECOLOGY | Facility: CLINIC | Age: 30
End: 2020-05-12

## 2020-05-12 VITALS — DIASTOLIC BLOOD PRESSURE: 70 MMHG | SYSTOLIC BLOOD PRESSURE: 98 MMHG | WEIGHT: 180.2 LBS | BODY MASS INDEX: 32.96 KG/M2

## 2020-05-12 DIAGNOSIS — Z98.891 HISTORY OF PRIMARY CESAREAN SECTION: ICD-10-CM

## 2020-05-12 DIAGNOSIS — O24.419 GESTATIONAL DIABETES MELLITUS (GDM) IN THIRD TRIMESTER, GESTATIONAL DIABETES METHOD OF CONTROL UNSPECIFIED: ICD-10-CM

## 2020-05-12 DIAGNOSIS — O24.415 GESTATIONAL DIABETES MELLITUS (GDM) IN THIRD TRIMESTER CONTROLLED ON ORAL HYPOGLYCEMIC DRUG: ICD-10-CM

## 2020-05-12 DIAGNOSIS — O09.299: ICD-10-CM

## 2020-05-12 DIAGNOSIS — O09.92 SUPERVISION OF HIGH RISK PREGNANCY IN SECOND TRIMESTER: ICD-10-CM

## 2020-05-12 DIAGNOSIS — F12.10 TETRAHYDROCANNABINOL (THC) USE DISORDER, MILD, ABUSE: ICD-10-CM

## 2020-05-12 DIAGNOSIS — Z3A.31 31 WEEKS GESTATION OF PREGNANCY: Primary | ICD-10-CM

## 2020-05-12 PROCEDURE — 99213 OFFICE O/P EST LOW 20 MIN: CPT | Performed by: FAMILY MEDICINE

## 2020-05-12 RX ORDER — METFORMIN HYDROCHLORIDE 500 MG/1
1000 TABLET, EXTENDED RELEASE ORAL NIGHTLY
Qty: 30 TABLET | Refills: 3
Start: 2020-05-12 | End: 2020-05-19

## 2020-05-12 RX ORDER — LANCETS 30 GAUGE
EACH MISCELLANEOUS
Qty: 100 EACH | Refills: 2 | Status: SHIPPED | OUTPATIENT
Start: 2020-05-12 | End: 2020-06-23

## 2020-05-12 NOTE — PROGRESS NOTES
CC: Prenatal visit    Ella Jeffries is a 30 y.o.  at 31w3d, pregnancy complicated by GDM-A2 (Metformin).  Doing well.  No complaints.  Denies contractions, LOF, or VB.  Reports good FM.    She has made changes to her diet.  Fastin-110  1hr postprandial: <140    BP 98/70   Wt 81.7 kg (180 lb 3.2 oz)   LMP 10/05/2019   BMI 32.96 kg/m²   SVE: deferred  Fundal Height (cm): 32 cm  Fetal Heart Rate: 125     Problems (from 19 to present)     Problem Noted Resolved    Gestational diabetes mellitus (GDM) in third trimester controlled on oral hypoglycemic drug 2020 by Kasandra Wesley MD No    Overview Addendum 2020  9:39 AM by Kasandra Wesley MD     Metformin 500mg nightly  Weekly BPP + serial growth scans starting at 32wks         Supervision of high-risk pregnancy 3/10/2020 by Kasandra Wesley MD No    Overview Addendum 2020  9:39 AM by Kasandra Wesley MD     A POS/ Rubella immune/ GBS @ 36wks  Dating: LMP   Genetics: declined  Tdap: 2020  Flu: n/a  Anatomy: cephalic, anterior placenta, BOY  1h Glucola: 203  3hr - 2 elevated readings  Lab Results   Component Value Date    HGB 11.7 (L) 2020    HCT 33.3 (L) 2020     2020      Breast feed  BC plan: undecided  Planned delivery method: RLTCS           History of cephalopelvic disproportion in prior pregnancy, currently pregnant 3/10/2020 by Kasandra Wesley MD No    Overview Addendum 3/10/2020  2:55 PM by Kasandra Wesley MD      pregnancy - s/p LTCS  7#3oz         Tetrahydrocannabinol (THC) use disorder, mild, abuse 2020 by Rosario Perez, APRN No    History of primary  section 2019 by Rosario Perez APRN No    Overview Addendum 2020  9:41 AM by Kasandra Wesley MD     For arrest of dilation/CPD  Op note not available; pt wanted to  - not a candidate         Tobacco use during pregnancy, antepartum  2019 by Rosario Perez APRN 2019 by Rosario Perez APRN          A/P: Ella Jeffries is a 30 y.o.  at 31w3d.  - Increase to Metformin 1000mg nightly.  IF BS not controlled will start NPH at next visit.  - RTC in 1 weeks with BPP + growth  - FKC reviewed.  PTL precautions given.  Encouraged to drink 3-4 glasses of ice water if she experiences contractions.  If contractions occur regularly (every 5-10 minutes or less) for 1 hour and do not resolve with drinking fluids and/or taking a warm bath, patient advised to come to L&D for evaluation.  - COVID precautions/policy reviewed.     Diagnosis Plan   1. Gestational diabetes mellitus (GDM) in third trimester controlled on oral hypoglycemic drug  US Ob Follow Up Transabdominal Approach   2. Supervision of high risk pregnancy in second trimester     3. History of cephalopelvic disproportion in prior pregnancy, currently pregnant     4. Tetrahydrocannabinol (THC) use disorder, mild, abuse     5. History of primary  section     6. Gestational diabetes mellitus (GDM) in third trimester, gestational diabetes method of control unspecified  Lancets misc    metFORMIN ER (GLUCOPHAGE-XR) 500 MG 24 hr tablet       Signature  Kasandra Wesley MD  [facility]  [address] [place] [address]  Office: [phone]      This document has been electronically signed by Kasandra Wesley MD on May 12, 2020 10:01

## 2020-05-19 ENCOUNTER — ROUTINE PRENATAL (OUTPATIENT)
Dept: OBSTETRICS AND GYNECOLOGY | Facility: CLINIC | Age: 30
End: 2020-05-19

## 2020-05-19 VITALS — DIASTOLIC BLOOD PRESSURE: 66 MMHG | BODY MASS INDEX: 33.18 KG/M2 | SYSTOLIC BLOOD PRESSURE: 100 MMHG | WEIGHT: 181.4 LBS

## 2020-05-19 DIAGNOSIS — O09.299: ICD-10-CM

## 2020-05-19 DIAGNOSIS — F12.10 TETRAHYDROCANNABINOL (THC) USE DISORDER, MILD, ABUSE: ICD-10-CM

## 2020-05-19 DIAGNOSIS — O09.92 SUPERVISION OF HIGH RISK PREGNANCY IN SECOND TRIMESTER: ICD-10-CM

## 2020-05-19 DIAGNOSIS — O24.414 INSULIN CONTROLLED GESTATIONAL DIABETES MELLITUS (GDM) IN THIRD TRIMESTER: ICD-10-CM

## 2020-05-19 DIAGNOSIS — O36.8990 UMBILICAL CORD CYST DURING PREGNANCY, ANTEPARTUM: ICD-10-CM

## 2020-05-19 DIAGNOSIS — Z98.891 HISTORY OF PRIMARY CESAREAN SECTION: ICD-10-CM

## 2020-05-19 DIAGNOSIS — Z3A.32 32 WEEKS GESTATION OF PREGNANCY: Primary | ICD-10-CM

## 2020-05-19 PROCEDURE — 99213 OFFICE O/P EST LOW 20 MIN: CPT | Performed by: FAMILY MEDICINE

## 2020-05-19 NOTE — PROGRESS NOTES
CC: Prenatal visit    Ella Jeffries is a 30 y.o.  at 32w3d.  Doing well.  No complaints.  Denies contractions, LOF, or VB.  Reports good FM.    Fastin-115  1hr postprandial: <140    Growth: cephalic, ant/left placenta, , BPP 8/8; EFW 2133g (4#11oz) 64%tile, AC 79%tile, Umbilical cord cyst.    /66   Wt 82.3 kg (181 lb 6.4 oz)   LMP 10/05/2019   BMI 33.18 kg/m²   SVE: deferred  Fundal Height (cm): 33 cm  Fetal Heart Rate: 125     Problems (from 19 to present)     Problem Noted Resolved    Gestational diabetes mellitus (GDM) in third trimester controlled on oral hypoglycemic drug 2020 by Kasandra Wesley MD No    Overview Addendum 2020 10:19 AM by Kasandra Wesley MD     Metformin 1000mg nightly  Weekly BPP + serial growth scans starting at 32wks         Supervision of high-risk pregnancy 3/10/2020 by Kasandra Wesley MD No    Overview Addendum 2020  9:39 AM by Kasandra Wesley MD     A POS/ Rubella immune/ GBS @ 36wks  Dating: LMP   Genetics: declined  Tdap: 2020  Flu: n/a  Anatomy: cephalic, anterior placenta, BOY  1h Glucola: 203  3hr - 2 elevated readings  Lab Results   Component Value Date    HGB 11.7 (L) 2020    HCT 33.3 (L) 2020     2020      Breast feed  BC plan: undecided  Planned delivery method: RLTCS           History of cephalopelvic disproportion in prior pregnancy, currently pregnant 3/10/2020 by Kasandra Wesley MD No    Overview Addendum 3/10/2020  2:55 PM by Kasandra Wesley MD      pregnancy - s/p LTCS  7#3oz         Tetrahydrocannabinol (THC) use disorder, mild, abuse 2020 by Rosario Perez APRN No    History of primary  section 2019 by Rosario Perez APRN No    Overview Addendum 2020  9:41 AM by Kasandra Wesley MD     For arrest of dilation/CPD  Op note not available; pt wanted to  - not a candidate          Tobacco use during pregnancy, antepartum 2019 by Rosario Perez, THAO 2019 by Rosario Perez APRN          A/P: Ella Jeffries is a 30 y.o.  at 32w3d.  - RTC on 2020 for rpt BPP & MFM consult in [facility]  - Rpt BPP only 20  - fasting blood glucose still not at goal with nightly metformin, plan to start NPH 20 units nightly.  Will call patient on 2020 to assess response.  - FKC reviewed.  PTL precautions given.  Encouraged to drink 3-4 glasses of ice water if she experiences contractions.  If contractions occur regularly (every 5-10 minutes or less) for 1 hour and do not resolve with drinking fluids and/or taking a warm bath, patient advised to come to L&D for evaluation.  - COVID policy/precautions given     Diagnosis Plan   1. 32 weeks gestation of pregnancy     2. Insulin controlled gestational diabetes mellitus (GDM) in third trimester     3. Supervision of high risk pregnancy in second trimester     4. History of cephalopelvic disproportion in prior pregnancy, currently pregnant     5. Tetrahydrocannabinol (THC) use disorder, mild, abuse     6. History of primary  section     7. Umbilical cord cyst during pregnancy, antepartum  US Fetal Biophysical Profile;With Non-Stress Testing    US Fetal Biophysical Profile;With Non-Stress Testing    US Fetal Biophysical Profile;With Non-Stress Testing    US Fetal Biophysical Profile;With Non-Stress Testing       Signature  Kasandra Wesley MD  [place] [address]  Office: [phone]      This document has been electronically signed by Kasandra Wesley MD on May 19, 2020 11:15

## 2020-05-20 DIAGNOSIS — O24.415 GESTATIONAL DIABETES MELLITUS (GDM) IN THIRD TRIMESTER CONTROLLED ON ORAL HYPOGLYCEMIC DRUG: ICD-10-CM

## 2020-05-26 ENCOUNTER — ROUTINE PRENATAL (OUTPATIENT)
Dept: OBSTETRICS AND GYNECOLOGY | Facility: CLINIC | Age: 30
End: 2020-05-26

## 2020-05-26 ENCOUNTER — APPOINTMENT (OUTPATIENT)
Dept: LAB | Facility: HOSPITAL | Age: 30
End: 2020-05-26

## 2020-05-26 VITALS — DIASTOLIC BLOOD PRESSURE: 72 MMHG | BODY MASS INDEX: 32.37 KG/M2 | SYSTOLIC BLOOD PRESSURE: 110 MMHG | WEIGHT: 177 LBS

## 2020-05-26 DIAGNOSIS — Z36.89 ENCOUNTER FOR FETAL ANATOMIC SURVEY: Primary | ICD-10-CM

## 2020-05-26 DIAGNOSIS — O24.414 INSULIN CONTROLLED GESTATIONAL DIABETES MELLITUS (GDM) IN THIRD TRIMESTER: ICD-10-CM

## 2020-05-26 DIAGNOSIS — F12.10 TETRAHYDROCANNABINOL (THC) USE DISORDER, MILD, ABUSE: ICD-10-CM

## 2020-05-26 DIAGNOSIS — Z3A.33 33 WEEKS GESTATION OF PREGNANCY: ICD-10-CM

## 2020-05-26 DIAGNOSIS — O09.299: ICD-10-CM

## 2020-05-26 DIAGNOSIS — Z98.891 HISTORY OF PRIMARY CESAREAN SECTION: ICD-10-CM

## 2020-05-26 DIAGNOSIS — O36.8990 UMBILICAL CORD CYST DURING PREGNANCY, ANTEPARTUM: Primary | ICD-10-CM

## 2020-05-26 DIAGNOSIS — O09.93 SUPERVISION OF HIGH RISK PREGNANCY IN THIRD TRIMESTER: ICD-10-CM

## 2020-05-26 PROCEDURE — 59025 FETAL NON-STRESS TEST: CPT | Performed by: NURSE PRACTITIONER

## 2020-05-26 PROCEDURE — 99214 OFFICE O/P EST MOD 30 MIN: CPT | Performed by: NURSE PRACTITIONER

## 2020-05-26 NOTE — PROGRESS NOTES
CC: Prenatal visit; hx reviewed, no changes.     Ella Jeffries is a 30 y.o.  at 33w3d.  Doing well.  Denies dysuria, vb, LOF, abnormal vaginal d/c, headaches, heartburn, constipation or regular contractions.     /72   Wt 80.3 kg (177 lb)   LMP 10/05/2019   BMI 32.37 kg/m²   SVE: NA  BPP- 8/8, REACTIVE NST  Pt saw Dr. Lombardi today after her u/s for a consult regarding the 10cm umbilical cyst. He wants her to have a twice weekly BPP with dopplers and twice weekly NST.    Dr. Wesley has been managing her GDM.   Fasting   1hr PP- <140    **MFM did not know that the patient has GDM when they did her scan and consult.**  Fundal Height (cm): 33 cm  Fetal Heart Rate: 128     Problems (from 19 to present)     Problem Noted Resolved    Umbilical cord cyst during pregnancy, antepartum 2020 by Kasandra Wesley MD No    Overview Addendum 2020  2:21 PM by Anusha Hanson APRN     2x weekly BPP + Doppler augmented by twice weekly NST for cord compression  Growth q3wk         Insulin controlled gestational diabetes mellitus (GDM) in third trimester 2020 by Kasandra Wesley MD No    Overview Addendum 2020  2:30 PM by Anusha Hanson APRN     Not controlled with Metformin  Start NPH 20 units nightly on 2020           Supervision of high-risk pregnancy 3/10/2020 by Kasandra Wesley MD No    Overview Addendum 2020  2:22 PM by Anusha Hanson APRN     A POS/ Rubella immune/ GBS @ 36wks  Dating: LMP   Genetics: declined  Tdap: 2020  Flu: n/a  Anatomy: cephalic, anterior placenta, BOY  1h Glucola: 203  3hr - 2 elevated readings  Lab Results   Component Value Date    HGB 11.7 (L) 2020    HCT 33.3 (L) 2020     2020      Breast feed  BC plan: undecided  Planned delivery method: RLTCS @ 37 weeks per MFM           History of cephalopelvic disproportion in prior pregnancy, currently pregnant 3/10/2020 by [facility],  MD Kasandra No    Overview Addendum 3/10/2020  2:55 PM by Kasandra Wesley MD      pregnancy - s/p LTCS  7#3oz         Tetrahydrocannabinol (THC) use disorder, mild, abuse 2020 by Rosario Perez APRN No    History of primary  section 2019 by Rosario Perez APRN No    Overview Addendum 2020  9:41 AM by Kasandra Wesley MD     For arrest of dilation/CPD  Op note not available; pt wanted to  - not a candidate         Tobacco use during pregnancy, antepartum 2019 by Rosario Perez, APRN 2019 by Rosario Perez APRN          A/P: Ella Jeffries is a 30 y.o.  at 33w3d.  - RTC in 4 days  - Reviewed COVID-19 visitation policy  - Reviewed COVID-19 precautions     Diagnosis Plan   1. Umbilical cord cyst during pregnancy, antepartum  Twice weekly BPP w/ dopplers and twice weekly NST   2. Insulin controlled gestational diabetes mellitus (GDM) in third trimester  Continue insulin at 20 units at bedtime   3. Supervision of high risk pregnancy in third trimester     4. History of cephalopelvic disproportion in prior pregnancy, currently pregnant  MFM recommends deliver at 37 weeks   5. Tetrahydrocannabinol (THC) use disorder, mild, abuse     6. History of primary  section     7. 33 weeks gestation of pregnancy       THAO Sheets  2020  14:30

## 2020-05-29 ENCOUNTER — ROUTINE PRENATAL (OUTPATIENT)
Dept: OBSTETRICS AND GYNECOLOGY | Facility: CLINIC | Age: 30
End: 2020-05-29

## 2020-05-29 VITALS — WEIGHT: 179 LBS | DIASTOLIC BLOOD PRESSURE: 74 MMHG | SYSTOLIC BLOOD PRESSURE: 108 MMHG | BODY MASS INDEX: 32.74 KG/M2

## 2020-05-29 DIAGNOSIS — O24.414 INSULIN CONTROLLED GESTATIONAL DIABETES MELLITUS (GDM) IN THIRD TRIMESTER: ICD-10-CM

## 2020-05-29 DIAGNOSIS — O09.299: ICD-10-CM

## 2020-05-29 DIAGNOSIS — O36.8990 UMBILICAL CORD CYST DURING PREGNANCY, ANTEPARTUM: ICD-10-CM

## 2020-05-29 DIAGNOSIS — F12.10 TETRAHYDROCANNABINOL (THC) USE DISORDER, MILD, ABUSE: ICD-10-CM

## 2020-05-29 DIAGNOSIS — Z3A.33 33 WEEKS GESTATION OF PREGNANCY: Primary | ICD-10-CM

## 2020-05-29 DIAGNOSIS — Z98.891 HISTORY OF PRIMARY CESAREAN SECTION: ICD-10-CM

## 2020-05-29 DIAGNOSIS — O09.93 SUPERVISION OF HIGH RISK PREGNANCY IN THIRD TRIMESTER: ICD-10-CM

## 2020-05-29 PROCEDURE — 59025 FETAL NON-STRESS TEST: CPT | Performed by: OBSTETRICS & GYNECOLOGY

## 2020-05-29 PROCEDURE — 99213 OFFICE O/P EST LOW 20 MIN: CPT | Performed by: OBSTETRICS & GYNECOLOGY

## 2020-05-30 NOTE — PROGRESS NOTES
CC: Prenatal visit    Ella Jeffries is a 30 y.o.  at 33w6d.  Doing well.  Denies contractions, LOF, or VB.  Reports good FM.    /74   Wt 81.2 kg (179 lb)   LMP 10/05/2019   BMI 32.74 kg/m²   SVE: Not done  Fundal Height (cm): 34 cm  Fetal Heart Rate: 133     Problems (from 19 to present)     Problem Noted Resolved    Umbilical cord cyst during pregnancy, antepartum 2020 by Kasandra Wesley MD No    Overview Addendum 2020  2:21 PM by Anusha Hanson APRN     2x weekly BPP + Doppler augmented by twice weekly NST for cord compression  Growth q3wk         Insulin controlled gestational diabetes mellitus (GDM) in third trimester 2020 by Kasandra Wesley MD No    Overview Addendum 2020  2:30 PM by Anusha Hanson APRN     Not controlled with Metformin  Start NPH 20 units nightly on 2020           Supervision of high-risk pregnancy 3/10/2020 by Kasandra Wesley MD No    Overview Addendum 2020  2:22 PM by Anusha Hanson APRN     A POS/ Rubella immune/ GBS @ 36wks  Dating: LMP   Genetics: declined  Tdap: 2020  Flu: n/a  Anatomy: cephalic, anterior placenta, BOY  1h Glucola: 203  3hr - 2 elevated readings  Lab Results   Component Value Date    HGB 11.7 (L) 2020    HCT 33.3 (L) 2020     2020      Breast feed  BC plan: undecided  Planned delivery method: RLTCS @ 37 weeks per MFM           History of cephalopelvic disproportion in prior pregnancy, currently pregnant 3/10/2020 by Kasandra Wesley MD No    Overview Addendum 3/10/2020  2:55 PM by Kasandra Wesley MD      pregnancy - s/p LTCS  7#3oz         Tetrahydrocannabinol (THC) use disorder, mild, abuse 2020 by Rosario Perez APRN No    History of primary  section 2019 by Rosario Perez APRN No    Overview Addendum 2020  9:41 AM by Kasandra Wesley MD     For arrest of dilation/CPD  Op note not  available; pt wanted to  - not a candidate         Tobacco use during pregnancy, antepartum 2019 by Rosario Perez, APRN 2019 by Rosario Perez, THAO          A/P: Ella Jeffries is a 30 y.o.  at 33w6d.  - RTC tx4estg  - Reviewed COVID-19 visitation policy  - Reviewed COVID-19 precautions     Diagnosis Plan   1. 33 weeks gestation of pregnancy     2. Umbilical cord cyst during pregnancy, antepartum     3. Insulin controlled gestational diabetes mellitus (GDM) in third trimester   reports blood sugars doing well.    Biophysical profile  NST reactive   4. Supervision of high risk pregnancy in third trimester     5. History of cephalopelvic disproportion in prior pregnancy, currently pregnant     6. Tetrahydrocannabinol (THC) use disorder, mild, abuse     7. History of primary  section       Bladimir Gandhi MD  2020  20:30

## 2020-06-02 ENCOUNTER — ROUTINE PRENATAL (OUTPATIENT)
Dept: OBSTETRICS AND GYNECOLOGY | Facility: CLINIC | Age: 30
End: 2020-06-02

## 2020-06-02 VITALS — BODY MASS INDEX: 33.14 KG/M2 | SYSTOLIC BLOOD PRESSURE: 102 MMHG | WEIGHT: 181.2 LBS | DIASTOLIC BLOOD PRESSURE: 68 MMHG

## 2020-06-02 DIAGNOSIS — O09.93 SUPERVISION OF HIGH RISK PREGNANCY IN THIRD TRIMESTER: ICD-10-CM

## 2020-06-02 DIAGNOSIS — Z98.891 HISTORY OF PRIMARY CESAREAN SECTION: ICD-10-CM

## 2020-06-02 DIAGNOSIS — Z3A.34 34 WEEKS GESTATION OF PREGNANCY: Primary | ICD-10-CM

## 2020-06-02 DIAGNOSIS — F12.10 TETRAHYDROCANNABINOL (THC) USE DISORDER, MILD, ABUSE: ICD-10-CM

## 2020-06-02 DIAGNOSIS — O09.299: ICD-10-CM

## 2020-06-02 DIAGNOSIS — O36.8990 UMBILICAL CORD CYST DURING PREGNANCY, ANTEPARTUM: ICD-10-CM

## 2020-06-02 DIAGNOSIS — O24.414 INSULIN CONTROLLED GESTATIONAL DIABETES MELLITUS (GDM) IN THIRD TRIMESTER: ICD-10-CM

## 2020-06-02 PROCEDURE — 99213 OFFICE O/P EST LOW 20 MIN: CPT | Performed by: FAMILY MEDICINE

## 2020-06-02 PROCEDURE — 59025 FETAL NON-STRESS TEST: CPT | Performed by: FAMILY MEDICINE

## 2020-06-02 RX ORDER — METHYLERGONOVINE MALEATE 0.2 MG/ML
200 INJECTION INTRAVENOUS ONCE AS NEEDED
Status: CANCELLED | OUTPATIENT
Start: 2020-06-02

## 2020-06-02 RX ORDER — MISOPROSTOL 100 UG/1
800 TABLET ORAL AS NEEDED
Status: CANCELLED | OUTPATIENT
Start: 2020-06-02

## 2020-06-02 RX ORDER — PROMETHAZINE HYDROCHLORIDE 25 MG/ML
6.25 INJECTION, SOLUTION INTRAMUSCULAR; INTRAVENOUS
Status: CANCELLED | OUTPATIENT
Start: 2020-06-02

## 2020-06-02 RX ORDER — SODIUM CHLORIDE 0.9 % (FLUSH) 0.9 %
3 SYRINGE (ML) INJECTION EVERY 12 HOURS SCHEDULED
Status: CANCELLED | OUTPATIENT
Start: 2020-06-02

## 2020-06-02 RX ORDER — PROMETHAZINE HYDROCHLORIDE 25 MG/1
12.5 TABLET ORAL EVERY 6 HOURS PRN
Status: CANCELLED | OUTPATIENT
Start: 2020-06-02

## 2020-06-02 RX ORDER — LIDOCAINE HYDROCHLORIDE 10 MG/ML
5 INJECTION, SOLUTION EPIDURAL; INFILTRATION; INTRACAUDAL; PERINEURAL AS NEEDED
Status: CANCELLED | OUTPATIENT
Start: 2020-06-02

## 2020-06-02 RX ORDER — SODIUM CHLORIDE 0.9 % (FLUSH) 0.9 %
3-10 SYRINGE (ML) INJECTION AS NEEDED
Status: CANCELLED | OUTPATIENT
Start: 2020-06-02

## 2020-06-02 RX ORDER — CARBOPROST TROMETHAMINE 250 UG/ML
250 INJECTION, SOLUTION INTRAMUSCULAR AS NEEDED
Status: CANCELLED | OUTPATIENT
Start: 2020-06-02

## 2020-06-02 RX ORDER — PROMETHAZINE HYDROCHLORIDE 25 MG/1
12.5 SUPPOSITORY RECTAL EVERY 6 HOURS PRN
Status: CANCELLED | OUTPATIENT
Start: 2020-06-02

## 2020-06-02 NOTE — PROGRESS NOTES
CC: Prenatal visit    Ella Jeffries is a 30 y.o.  at 34w3d pregnancy complicated by GDMA2 (insulin) and umbilical cord cyst.  Doing well.  No complaints.  Denies contractions, LOF, or VB.  Reports good FM.  She met with MFM 2020 regarding umbilical cord cyst - they recommend delivery at ~37wks due to risk of poor outcome; NIPS also collected at that time.    Currently taking NPH 20 units at night time. Fasting B-116 (several readings >95); 1hr postprandial all <140    BPP: cephalic, anterior, FHR 130bpm, BPP 8/8, umbilical artery doppler S/D 3.16    /68   Wt 82.2 kg (181 lb 3.2 oz)   LMP 10/05/2019   BMI 33.14 kg/m²   SVE: deferred  Fundal Height (cm): 35 cm  Fetal Heart Rate: 130     Problems (from 19 to present)     Problem Noted Resolved    Umbilical cord cyst during pregnancy, antepartum 2020 by Kasandra Wesley MD No    Overview Addendum 2020  1:19 PM by Kasandra Wesley MD     10.8 x 4.42x 4.98cm on 2020  2x weekly BPP + Doppler augmented by twice weekly NST for cord compression  Growth q3wk         Insulin controlled gestational diabetes mellitus (GDM) in third trimester 2020 by Kasandra Wesley MD No    Overview Addendum 2020  2:30 PM by Anusha Hanson APRN     Not controlled with Metformin  Start NPH 20 units nightly on 2020           Supervision of high-risk pregnancy 3/10/2020 by Kasandra Wesley MD No    Overview Addendum 2020  2:22 PM by Anusha Hanson APRN     A POS/ Rubella immune/ GBS @ 36wks  Dating: LMP   Genetics: declined  Tdap: 2020  Flu: n/a  Anatomy: cephalic, anterior placenta, BOY  1h Glucola: 203  3hr - 2 elevated readings  Lab Results   Component Value Date    HGB 11.7 (L) 2020    HCT 33.3 (L) 2020     2020      Breast feed  BC plan: undecided  Planned delivery method: RLTCS @ 37 weeks per MFM           History of cephalopelvic disproportion in prior  pregnancy, currently pregnant 3/10/2020 by Kasandra Wesley MD No    Overview Addendum 3/10/2020  2:55 PM by Kasandra Wesley MD      pregnancy - s/p LTCS  7#3oz         Tetrahydrocannabinol (THC) use disorder, mild, abuse 2020 by Rosario Perez APRN No    History of primary  section 2019 by Rosario Perez APRN No    Overview Addendum 2020  9:41 AM by Kasandra Wesley MD     For arrest of dilation/CPD  Op note not available; pt wanted to  - not a candidate         Tobacco use during pregnancy, antepartum 2019 by Rosario Perez, APRN 2019 by Rosario Perez APRN          A/P: Ella Jeffries is a 30 y.o.  at 34w3d.  - NST Reactive  - return Friday for BPP only  - RTC in 1 week for BPP/NST + office visit  - NIPS results still pending  - Increase to NPH 22 units nightly  - Scheduled for RLTCS 2020 per [facility] recs  - FKC reviewed.  PTL precautions given.  Encouraged to drink 3-4 glasses of ice water if she experiences contractions.  If contractions occur regularly (every 5-10 minutes or less) for 1 hour and do not resolve with drinking fluids and/or taking a warm bath, patient advised to come to L&D for evaluation.     Diagnosis Plan   1. 34 weeks gestation of pregnancy     2. Umbilical cord cyst during pregnancy, antepartum  US Fetal Biophysical Profile;With Non-Stress Testing   3. Insulin controlled gestational diabetes mellitus (GDM) in third trimester     4. Supervision of high risk pregnancy in third trimester     5. History of cephalopelvic disproportion in prior pregnancy, currently pregnant     6. Tetrahydrocannabinol (THC) use disorder, mild, abuse     7. History of primary  section         Signature  Kasandra Wesley MD  [facility]  [address] [place] [address]  Office: [phone]      This document has been electronically  signed by Kasandra Wesley MD on June 2, 2020 11:21

## 2020-06-04 DIAGNOSIS — O36.8990 UMBILICAL CORD CYST DURING PREGNANCY, ANTEPARTUM: ICD-10-CM

## 2020-06-05 ENCOUNTER — RESULTS ENCOUNTER (OUTPATIENT)
Dept: OBSTETRICS AND GYNECOLOGY | Facility: CLINIC | Age: 30
End: 2020-06-05

## 2020-06-05 DIAGNOSIS — O36.8990 UMBILICAL CORD CYST DURING PREGNANCY, ANTEPARTUM: ICD-10-CM

## 2020-06-08 ENCOUNTER — ROUTINE PRENATAL (OUTPATIENT)
Dept: OBSTETRICS AND GYNECOLOGY | Facility: CLINIC | Age: 30
End: 2020-06-08

## 2020-06-08 VITALS — DIASTOLIC BLOOD PRESSURE: 82 MMHG | SYSTOLIC BLOOD PRESSURE: 104 MMHG | WEIGHT: 180 LBS | BODY MASS INDEX: 32.92 KG/M2

## 2020-06-08 DIAGNOSIS — Z3A.35 35 WEEKS GESTATION OF PREGNANCY: Primary | ICD-10-CM

## 2020-06-08 DIAGNOSIS — F12.10 TETRAHYDROCANNABINOL (THC) USE DISORDER, MILD, ABUSE: ICD-10-CM

## 2020-06-08 DIAGNOSIS — O09.299: ICD-10-CM

## 2020-06-08 DIAGNOSIS — Z98.891 HISTORY OF PRIMARY CESAREAN SECTION: ICD-10-CM

## 2020-06-08 DIAGNOSIS — O36.8990 UMBILICAL CORD CYST DURING PREGNANCY, ANTEPARTUM: ICD-10-CM

## 2020-06-08 DIAGNOSIS — O24.414 INSULIN CONTROLLED GESTATIONAL DIABETES MELLITUS (GDM) IN THIRD TRIMESTER: ICD-10-CM

## 2020-06-08 DIAGNOSIS — O09.93 SUPERVISION OF HIGH RISK PREGNANCY IN THIRD TRIMESTER: ICD-10-CM

## 2020-06-08 PROBLEM — Z3A.33 33 WEEKS GESTATION OF PREGNANCY: Status: RESOLVED | Noted: 2020-05-29 | Resolved: 2020-06-08

## 2020-06-08 PROCEDURE — 59025 FETAL NON-STRESS TEST: CPT | Performed by: FAMILY MEDICINE

## 2020-06-08 PROCEDURE — 99213 OFFICE O/P EST LOW 20 MIN: CPT | Performed by: FAMILY MEDICINE

## 2020-06-08 NOTE — PROGRESS NOTES
CC: Prenatal visit     Ella Jeffries is a 30 y.o.  at 35w2d pregnancy complicated by GDMA2 (insulin) & umbilical cord cyst.  Doing well.  No complaints.  Denies contractions, LOF, or VB.  Reports good FM.    US: cephalic, anterior placenta, FHR 131bpm, JIGNA wnl, EFW 3140g ([id number]) >92%tile, AC >97%tile, BPP 8/8, dopplers wnl    Currently using NPH 22units nightly.  Fasting blood glucose <95, 1hr postprandial <140    /82   Wt 81.6 kg (180 lb)   LMP 10/05/2019   BMI 32.92 kg/m²   SVE: deferred  Fundal Height (cm): 36 cm  Fetal Heart Rate: 131 US   NST: FHR 130bpm, moderate variability, +accels, no decels.     Problems (from 19 to present)     Problem Noted Resolved    Umbilical cord cyst during pregnancy, antepartum 2020 by Kasandra Wesley MD No    Overview Addendum 2020  1:19 PM by Kasandra Wesley MD     10.8 x 4.42x 4.98cm on 2020  2x weekly BPP + Doppler augmented by twice weekly NST for cord compression  Growth q3wk         Insulin controlled gestational diabetes mellitus (GDM) in third trimester 2020 by Kasandra Wesley MD No    Overview Addendum 2020  2:30 PM by Anusha Hanson APRN     Not controlled with Metformin  Start NPH 20 units nightly on 2020           Supervision of high-risk pregnancy 3/10/2020 by Kasandra Wesley MD No    Overview Addendum 2020  2:22 PM by Anusha Hanson APRN     A POS/ Rubella immune/ GBS @ 36wks  Dating: LMP   Genetics: declined  Tdap: 2020  Flu: n/a  Anatomy: cephalic, anterior placenta, BOY  1h Glucola: 203  3hr - 2 elevated readings  Lab Results   Component Value Date    HGB 11.7 (L) 2020    HCT 33.3 (L) 2020     2020      Breast feed  BC plan: undecided  Planned delivery method: RLTCS @ 37 weeks per MFM           History of cephalopelvic disproportion in prior pregnancy, currently pregnant 3/10/2020 by Kasandra Wesley MD No    Overview Addendum  3/10/2020  2:55 PM by Kasandra Wesley MD      pregnancy - s/p LTCS  7#3oz         Tetrahydrocannabinol (THC) use disorder, mild, abuse 2020 by Rosario Perez APRN No    History of primary  section 2019 by Rosario Perez APRN No    Overview Addendum 2020  9:41 AM by Kasandra Wesley MD     For arrest of dilation/CPD  Op note not available; pt wanted to  - not a candidate         Tobacco use during pregnancy, antepartum 2019 by Rosario Perez APRN 2019 by Rosario Perez APRN          A/P: Ella Jeffries is a 30 y.o.  at 35w2d.  - RTC Thurs for BPP only.  - RTC in 1 week for rpt BPP/Doppler + NST  - Scheduled for RLTCS 20  - COVID policy/precautions discussed     Diagnosis Plan   1. 35 weeks gestation of pregnancy     2. Umbilical cord cyst during pregnancy, antepartum     3. Insulin controlled gestational diabetes mellitus (GDM) in third trimester     4. Supervision of high risk pregnancy in third trimester     5. History of cephalopelvic disproportion in prior pregnancy, currently pregnant     6. Tetrahydrocannabinol (THC) use disorder, mild, abuse     7. History of primary  section         Signature  Kasandra Wesley MD  [facility] [address], [place] [address]  Office: [phone]      This document has been electronically signed by Kasandra Wesley MD on 2020 11:45

## 2020-06-09 ENCOUNTER — RESULTS ENCOUNTER (OUTPATIENT)
Dept: OBSTETRICS AND GYNECOLOGY | Facility: CLINIC | Age: 30
End: 2020-06-09

## 2020-06-09 DIAGNOSIS — O36.8990 UMBILICAL CORD CYST DURING PREGNANCY, ANTEPARTUM: ICD-10-CM

## 2020-06-10 ENCOUNTER — TELEPHONE (OUTPATIENT)
Dept: OBSTETRICS AND GYNECOLOGY | Facility: CLINIC | Age: 30
End: 2020-06-10

## 2020-06-11 DIAGNOSIS — Z13.1 ENCOUNTER FOR SCREENING FOR DIABETES MELLITUS: Primary | ICD-10-CM

## 2020-06-12 ENCOUNTER — RESULTS ENCOUNTER (OUTPATIENT)
Dept: OBSTETRICS AND GYNECOLOGY | Facility: CLINIC | Age: 30
End: 2020-06-12

## 2020-06-12 DIAGNOSIS — O36.8990 UMBILICAL CORD CYST DURING PREGNANCY, ANTEPARTUM: ICD-10-CM

## 2020-06-12 DIAGNOSIS — O24.419 GESTATIONAL DIABETES MELLITUS (GDM) IN THIRD TRIMESTER, GESTATIONAL DIABETES METHOD OF CONTROL UNSPECIFIED: ICD-10-CM

## 2020-06-14 ENCOUNTER — ANESTHESIA (OUTPATIENT)
Dept: LABOR AND DELIVERY | Facility: HOSPITAL | Age: 30
End: 2020-06-14

## 2020-06-14 ENCOUNTER — HOSPITAL ENCOUNTER (INPATIENT)
Facility: HOSPITAL | Age: 30
LOS: 2 days | Discharge: HOME | End: 2020-06-16
Attending: FAMILY MEDICINE | Admitting: FAMILY MEDICINE

## 2020-06-14 ENCOUNTER — ANESTHESIA EVENT (OUTPATIENT)
Dept: LABOR AND DELIVERY | Facility: HOSPITAL | Age: 30
End: 2020-06-14

## 2020-06-14 DIAGNOSIS — Z98.891 HISTORY OF PRIMARY CESAREAN SECTION: ICD-10-CM

## 2020-06-14 DIAGNOSIS — O36.8990 UMBILICAL CORD CYST DURING PREGNANCY, ANTEPARTUM: ICD-10-CM

## 2020-06-14 DIAGNOSIS — Z98.891 STATUS POST REPEAT LOW TRANSVERSE CESAREAN SECTION: Primary | ICD-10-CM

## 2020-06-14 DIAGNOSIS — O24.414 INSULIN CONTROLLED GESTATIONAL DIABETES MELLITUS (GDM) IN THIRD TRIMESTER: ICD-10-CM

## 2020-06-14 LAB
AMPHET+METHAMPHET UR QL: NEGATIVE
AMPHETAMINES UR QL: NEGATIVE
BARBITURATES UR QL SCN: NEGATIVE
BENZODIAZ UR QL SCN: NEGATIVE
BUPRENORPHINE SERPL-MCNC: NEGATIVE NG/ML
CANNABINOIDS SERPL QL: NEGATIVE
COCAINE UR QL: NEGATIVE
DEPRECATED RDW RBC AUTO: 42.4 FL (ref 37–54)
ERYTHROCYTE [DISTWIDTH] IN BLOOD BY AUTOMATED COUNT: 13 % (ref 12.3–15.4)
HCT VFR BLD AUTO: 36.2 % (ref 34–46.6)
HGB BLD-MCNC: 12.7 G/DL (ref 12–15.9)
Lab: NORMAL
MCH RBC QN AUTO: 31.4 PG (ref 26.6–33)
MCHC RBC AUTO-ENTMCNC: 35.1 G/DL (ref 31.5–35.7)
MCV RBC AUTO: 89.4 FL (ref 79–97)
METHADONE UR QL SCN: NEGATIVE
OPIATES UR QL: NEGATIVE
OXYCODONE UR QL SCN: NEGATIVE
PCP UR QL SCN: NEGATIVE
PLATELET # BLD AUTO: 204 10*3/MM3 (ref 140–450)
PMV BLD AUTO: 11.8 FL (ref 6–12)
PROPOXYPH UR QL: NEGATIVE
RBC # BLD AUTO: 4.05 10*6/MM3 (ref 3.77–5.28)
TRICYCLICS UR QL SCN: NEGATIVE
WBC NRBC COR # BLD: 6.75 10*3/MM3 (ref 3.4–10.8)

## 2020-06-14 PROCEDURE — 25010000002 PHENYLEPHRINE 10 MG/ML SOLUTION: Performed by: NURSE ANESTHETIST, CERTIFIED REGISTERED

## 2020-06-14 PROCEDURE — 25010000002 MORPHINE PER 10 MG: Performed by: NURSE ANESTHETIST, CERTIFIED REGISTERED

## 2020-06-14 PROCEDURE — 88307 TISSUE EXAM BY PATHOLOGIST: CPT

## 2020-06-14 PROCEDURE — 59515 CESAREAN DELIVERY: CPT | Performed by: OBSTETRICS & GYNECOLOGY

## 2020-06-14 PROCEDURE — 80306 DRUG TEST PRSMV INSTRMNT: CPT | Performed by: FAMILY MEDICINE

## 2020-06-14 PROCEDURE — 82962 GLUCOSE BLOOD TEST: CPT

## 2020-06-14 PROCEDURE — 94799 UNLISTED PULMONARY SVC/PX: CPT

## 2020-06-14 PROCEDURE — 86901 BLOOD TYPING SEROLOGIC RH(D): CPT | Performed by: FAMILY MEDICINE

## 2020-06-14 PROCEDURE — 86900 BLOOD TYPING SEROLOGIC ABO: CPT | Performed by: FAMILY MEDICINE

## 2020-06-14 PROCEDURE — 85027 COMPLETE CBC AUTOMATED: CPT | Performed by: FAMILY MEDICINE

## 2020-06-14 PROCEDURE — 86850 RBC ANTIBODY SCREEN: CPT | Performed by: FAMILY MEDICINE

## 2020-06-14 PROCEDURE — 25010000003 CEFAZOLIN PER 500 MG: Performed by: NURSE ANESTHETIST, CERTIFIED REGISTERED

## 2020-06-14 PROCEDURE — 25010000002 AZITHROMYCIN 500 MG/250 ML: Performed by: OBSTETRICS & GYNECOLOGY

## 2020-06-14 PROCEDURE — 25010000002 ONDANSETRON PER 1 MG: Performed by: NURSE ANESTHETIST, CERTIFIED REGISTERED

## 2020-06-14 RX ORDER — OXYTOCIN/0.9 % SODIUM CHLORIDE 30/500 ML
PLASTIC BAG, INJECTION (ML) INTRAVENOUS
Status: COMPLETED
Start: 2020-06-14 | End: 2020-06-14

## 2020-06-14 RX ORDER — LIDOCAINE HYDROCHLORIDE 10 MG/ML
5 INJECTION, SOLUTION EPIDURAL; INFILTRATION; INTRACAUDAL; PERINEURAL AS NEEDED
Status: DISCONTINUED | OUTPATIENT
Start: 2020-06-14 | End: 2020-06-15 | Stop reason: HOSPADM

## 2020-06-14 RX ORDER — BUPIVACAINE HYDROCHLORIDE 7.5 MG/ML
INJECTION, SOLUTION EPIDURAL; RETROBULBAR
Status: COMPLETED | OUTPATIENT
Start: 2020-06-14 | End: 2020-06-14

## 2020-06-14 RX ORDER — TRISODIUM CITRATE DIHYDRATE AND CITRIC ACID MONOHYDRATE 500; 334 MG/5ML; MG/5ML
SOLUTION ORAL
Status: COMPLETED
Start: 2020-06-14 | End: 2020-06-14

## 2020-06-14 RX ORDER — BUPIVACAINE HCL/0.9 % NACL/PF 0.1 %
2 PLASTIC BAG, INJECTION (ML) EPIDURAL ONCE
Status: COMPLETED | OUTPATIENT
Start: 2020-06-14 | End: 2020-06-14

## 2020-06-14 RX ORDER — SODIUM CHLORIDE, SODIUM LACTATE, POTASSIUM CHLORIDE, CALCIUM CHLORIDE 600; 310; 30; 20 MG/100ML; MG/100ML; MG/100ML; MG/100ML
INJECTION, SOLUTION INTRAVENOUS
Status: COMPLETED
Start: 2020-06-14 | End: 2020-06-14

## 2020-06-14 RX ORDER — BUPIVACAINE HCL/0.9 % NACL/PF 0.1 %
2 PLASTIC BAG, INJECTION (ML) EPIDURAL EVERY 8 HOURS
Status: COMPLETED | OUTPATIENT
Start: 2020-06-15 | End: 2020-06-15

## 2020-06-14 RX ORDER — ONDANSETRON 2 MG/ML
INJECTION INTRAMUSCULAR; INTRAVENOUS AS NEEDED
Status: DISCONTINUED | OUTPATIENT
Start: 2020-06-14 | End: 2020-06-14 | Stop reason: SURG

## 2020-06-14 RX ORDER — SODIUM CHLORIDE, SODIUM LACTATE, POTASSIUM CHLORIDE, CALCIUM CHLORIDE 600; 310; 30; 20 MG/100ML; MG/100ML; MG/100ML; MG/100ML
INJECTION, SOLUTION INTRAVENOUS CONTINUOUS PRN
Status: DISCONTINUED | OUTPATIENT
Start: 2020-06-14 | End: 2020-06-14 | Stop reason: SURG

## 2020-06-14 RX ORDER — SODIUM CHLORIDE 0.9 % (FLUSH) 0.9 %
3-10 SYRINGE (ML) INJECTION AS NEEDED
Status: DISCONTINUED | OUTPATIENT
Start: 2020-06-14 | End: 2020-06-15 | Stop reason: HOSPADM

## 2020-06-14 RX ORDER — PHENYLEPHRINE HYDROCHLORIDE 10 MG/ML
INJECTION INTRAVENOUS AS NEEDED
Status: DISCONTINUED | OUTPATIENT
Start: 2020-06-14 | End: 2020-06-14 | Stop reason: SURG

## 2020-06-14 RX ORDER — CEFAZOLIN SODIUM 1 G/3ML
INJECTION, POWDER, FOR SOLUTION INTRAMUSCULAR; INTRAVENOUS AS NEEDED
Status: DISCONTINUED | OUTPATIENT
Start: 2020-06-14 | End: 2020-06-14 | Stop reason: SURG

## 2020-06-14 RX ORDER — IBUPROFEN 800 MG/1
800 TABLET ORAL EVERY 8 HOURS SCHEDULED
Status: DISCONTINUED | OUTPATIENT
Start: 2020-06-15 | End: 2020-06-16 | Stop reason: HOSPADM

## 2020-06-14 RX ORDER — OXYTOCIN/0.9 % SODIUM CHLORIDE 30/500 ML
PLASTIC BAG, INJECTION (ML) INTRAVENOUS CONTINUOUS PRN
Status: DISCONTINUED | OUTPATIENT
Start: 2020-06-14 | End: 2020-06-14 | Stop reason: SURG

## 2020-06-14 RX ORDER — SODIUM CHLORIDE 0.9 % (FLUSH) 0.9 %
3 SYRINGE (ML) INJECTION EVERY 12 HOURS SCHEDULED
Status: DISCONTINUED | OUTPATIENT
Start: 2020-06-14 | End: 2020-06-15 | Stop reason: HOSPADM

## 2020-06-14 RX ORDER — MORPHINE SULFATE 1 MG/ML
INJECTION, SOLUTION EPIDURAL; INTRATHECAL; INTRAVENOUS AS NEEDED
Status: DISCONTINUED | OUTPATIENT
Start: 2020-06-14 | End: 2020-06-14 | Stop reason: SURG

## 2020-06-14 RX ORDER — OXYCODONE HYDROCHLORIDE 5 MG/1
5 TABLET ORAL EVERY 4 HOURS PRN
Status: DISCONTINUED | OUTPATIENT
Start: 2020-06-14 | End: 2020-06-16 | Stop reason: HOSPADM

## 2020-06-14 RX ADMIN — OXYTOCIN-SODIUM CHLORIDE 0.9% IV SOLN 30 UNIT/500ML 650 ML/HR: 30-0.9/5 SOLUTION at 23:07

## 2020-06-14 RX ADMIN — BUPIVACAINE HYDROCHLORIDE 1.6 ML: 7.5 INJECTION, SOLUTION EPIDURAL; RETROBULBAR at 22:43

## 2020-06-14 RX ADMIN — Medication 0.2 MG: at 22:44

## 2020-06-14 RX ADMIN — SODIUM CHLORIDE, POTASSIUM CHLORIDE, SODIUM LACTATE AND CALCIUM CHLORIDE: 600; 310; 30; 20 INJECTION, SOLUTION INTRAVENOUS at 22:49

## 2020-06-14 RX ADMIN — Medication 2 G: at 22:15

## 2020-06-14 RX ADMIN — AZITHROMYCIN 500 MG: 500 INJECTION, POWDER, LYOPHILIZED, FOR SOLUTION INTRAVENOUS at 22:51

## 2020-06-14 RX ADMIN — ONDANSETRON HYDROCHLORIDE 8 MG: 2 INJECTION INTRAMUSCULAR; INTRAVENOUS at 22:36

## 2020-06-14 RX ADMIN — SODIUM CITRATE AND CITRIC ACID MONOHYDRATE 1 ML: 500; 334 SOLUTION ORAL at 22:30

## 2020-06-14 RX ADMIN — PHENYLEPHRINE HYDROCHLORIDE 100 MCG: 10 INJECTION INTRAVENOUS at 23:10

## 2020-06-14 RX ADMIN — CEFAZOLIN SODIUM 2 G: 1 INJECTION, POWDER, FOR SOLUTION INTRAMUSCULAR; INTRAVENOUS at 22:47

## 2020-06-14 RX ADMIN — SODIUM CHLORIDE, POTASSIUM CHLORIDE, SODIUM LACTATE AND CALCIUM CHLORIDE 1000 ML: 600; 310; 30; 20 INJECTION, SOLUTION INTRAVENOUS at 22:19

## 2020-06-14 RX ADMIN — SODIUM CHLORIDE, POTASSIUM CHLORIDE, SODIUM LACTATE AND CALCIUM CHLORIDE: 600; 310; 30; 20 INJECTION, SOLUTION INTRAVENOUS at 22:20

## 2020-06-15 ENCOUNTER — TELEPHONE (OUTPATIENT)
Dept: OBSTETRICS AND GYNECOLOGY | Facility: CLINIC | Age: 30
End: 2020-06-15

## 2020-06-15 DIAGNOSIS — Z36.89 ENCOUNTER FOR FETAL ANATOMIC SURVEY: ICD-10-CM

## 2020-06-15 DIAGNOSIS — O36.8990 UMBILICAL CORD CYST DURING PREGNANCY, ANTEPARTUM: ICD-10-CM

## 2020-06-15 DIAGNOSIS — Z13.1 ENCOUNTER FOR SCREENING FOR DIABETES MELLITUS: ICD-10-CM

## 2020-06-15 LAB
ABO GROUP BLD: NORMAL
BASOPHILS # BLD AUTO: 0.02 10*3/MM3 (ref 0–0.2)
BASOPHILS NFR BLD AUTO: 0.2 % (ref 0–1.5)
BLD GP AB SCN SERPL QL: NEGATIVE
DEPRECATED RDW RBC AUTO: 43.2 FL (ref 37–54)
EOSINOPHIL # BLD AUTO: 0.03 10*3/MM3 (ref 0–0.4)
EOSINOPHIL NFR BLD AUTO: 0.3 % (ref 0.3–6.2)
ERYTHROCYTE [DISTWIDTH] IN BLOOD BY AUTOMATED COUNT: 13.1 % (ref 12.3–15.4)
GLUCOSE BLDC GLUCOMTR-MCNC: 123 MG/DL (ref 70–130)
HCT VFR BLD AUTO: 33.4 % (ref 34–46.6)
HGB BLD-MCNC: 11.3 G/DL (ref 12–15.9)
IMM GRANULOCYTES # BLD AUTO: 0.07 10*3/MM3 (ref 0–0.05)
IMM GRANULOCYTES NFR BLD AUTO: 0.6 % (ref 0–0.5)
LYMPHOCYTES # BLD AUTO: 1.28 10*3/MM3 (ref 0.7–3.1)
LYMPHOCYTES NFR BLD AUTO: 11.3 % (ref 19.6–45.3)
MCH RBC QN AUTO: 30.6 PG (ref 26.6–33)
MCHC RBC AUTO-ENTMCNC: 33.8 G/DL (ref 31.5–35.7)
MCV RBC AUTO: 90.5 FL (ref 79–97)
MONOCYTES # BLD AUTO: 0.61 10*3/MM3 (ref 0.1–0.9)
MONOCYTES NFR BLD AUTO: 5.4 % (ref 5–12)
NEUTROPHILS # BLD AUTO: 9.33 10*3/MM3 (ref 1.7–7)
NEUTROPHILS NFR BLD AUTO: 82.2 % (ref 42.7–76)
NRBC BLD AUTO-RTO: 0 /100 WBC (ref 0–0.2)
PLATELET # BLD AUTO: 180 10*3/MM3 (ref 140–450)
PMV BLD AUTO: 11.6 FL (ref 6–12)
RBC # BLD AUTO: 3.69 10*6/MM3 (ref 3.77–5.28)
RH BLD: POSITIVE
T&S EXPIRATION DATE: NORMAL
WBC NRBC COR # BLD: 11.34 10*3/MM3 (ref 3.4–10.8)

## 2020-06-15 PROCEDURE — 94799 UNLISTED PULMONARY SVC/PX: CPT

## 2020-06-15 PROCEDURE — 85025 COMPLETE CBC W/AUTO DIFF WBC: CPT | Performed by: OBSTETRICS & GYNECOLOGY

## 2020-06-15 PROCEDURE — 25010000002 KETOROLAC TROMETHAMINE PER 15 MG: Performed by: OBSTETRICS & GYNECOLOGY

## 2020-06-15 PROCEDURE — 25010000002 CEFAZOLIN PER 500 MG: Performed by: OBSTETRICS & GYNECOLOGY

## 2020-06-15 PROCEDURE — 63710000001 DIPHENHYDRAMINE PER 50 MG: Performed by: OBSTETRICS & GYNECOLOGY

## 2020-06-15 PROCEDURE — 99024 POSTOP FOLLOW-UP VISIT: CPT | Performed by: OBSTETRICS & GYNECOLOGY

## 2020-06-15 PROCEDURE — 51703 INSERT BLADDER CATH COMPLEX: CPT

## 2020-06-15 PROCEDURE — 25010000002 PROMETHAZINE PER 50 MG: Performed by: OBSTETRICS & GYNECOLOGY

## 2020-06-15 PROCEDURE — 25010000002 ONDANSETRON PER 1 MG: Performed by: NURSE ANESTHETIST, CERTIFIED REGISTERED

## 2020-06-15 RX ORDER — ONDANSETRON 2 MG/ML
4 INJECTION INTRAMUSCULAR; INTRAVENOUS ONCE AS NEEDED
Status: DISPENSED | OUTPATIENT
Start: 2020-06-15 | End: 2020-06-16

## 2020-06-15 RX ORDER — OXYTOCIN/0.9 % SODIUM CHLORIDE 30/500 ML
85 PLASTIC BAG, INJECTION (ML) INTRAVENOUS ONCE
Status: COMPLETED | OUTPATIENT
Start: 2020-06-15 | End: 2020-06-15

## 2020-06-15 RX ORDER — HYDROCORTISONE 25 MG/G
CREAM TOPICAL 3 TIMES DAILY PRN
Status: DISCONTINUED | OUTPATIENT
Start: 2020-06-15 | End: 2020-06-16 | Stop reason: HOSPADM

## 2020-06-15 RX ORDER — ACETAMINOPHEN 500 MG
1000 TABLET ORAL EVERY 8 HOURS
Status: DISCONTINUED | OUTPATIENT
Start: 2020-06-15 | End: 2020-06-16 | Stop reason: HOSPADM

## 2020-06-15 RX ORDER — PROMETHAZINE HYDROCHLORIDE 25 MG/ML
12.5 INJECTION, SOLUTION INTRAMUSCULAR; INTRAVENOUS EVERY 4 HOURS PRN
Status: DISCONTINUED | OUTPATIENT
Start: 2020-06-15 | End: 2020-06-16 | Stop reason: HOSPADM

## 2020-06-15 RX ORDER — PRENATAL VIT/IRON FUM/FOLIC AC 27MG-0.8MG
1 TABLET ORAL DAILY
Status: DISCONTINUED | OUTPATIENT
Start: 2020-06-15 | End: 2020-06-16 | Stop reason: HOSPADM

## 2020-06-15 RX ORDER — ACETAMINOPHEN 325 MG/1
650 TABLET ORAL EVERY 4 HOURS PRN
Status: ACTIVE | OUTPATIENT
Start: 2020-06-15 | End: 2020-06-16

## 2020-06-15 RX ORDER — OXYTOCIN/0.9 % SODIUM CHLORIDE 30/500 ML
650 PLASTIC BAG, INJECTION (ML) INTRAVENOUS ONCE
Status: DISCONTINUED | OUTPATIENT
Start: 2020-06-15 | End: 2020-06-16 | Stop reason: HOSPADM

## 2020-06-15 RX ORDER — DIPHENHYDRAMINE HCL 25 MG
25 CAPSULE ORAL EVERY 4 HOURS PRN
Status: DISCONTINUED | OUTPATIENT
Start: 2020-06-15 | End: 2020-06-16 | Stop reason: HOSPADM

## 2020-06-15 RX ORDER — ALUMINA, MAGNESIA, AND SIMETHICONE 2400; 2400; 240 MG/30ML; MG/30ML; MG/30ML
15 SUSPENSION ORAL EVERY 4 HOURS PRN
Status: DISCONTINUED | OUTPATIENT
Start: 2020-06-15 | End: 2020-06-16 | Stop reason: HOSPADM

## 2020-06-15 RX ORDER — NALOXONE HCL 0.4 MG/ML
0.04 VIAL (ML) INJECTION
Status: DISCONTINUED | OUTPATIENT
Start: 2020-06-15 | End: 2020-06-16 | Stop reason: HOSPADM

## 2020-06-15 RX ORDER — KETOROLAC TROMETHAMINE 15 MG/ML
15 INJECTION, SOLUTION INTRAMUSCULAR; INTRAVENOUS EVERY 6 HOURS
Status: COMPLETED | OUTPATIENT
Start: 2020-06-15 | End: 2020-06-15

## 2020-06-15 RX ORDER — SIMETHICONE 80 MG
80 TABLET,CHEWABLE ORAL 4 TIMES DAILY PRN
Status: DISCONTINUED | OUTPATIENT
Start: 2020-06-15 | End: 2020-06-16 | Stop reason: HOSPADM

## 2020-06-15 RX ORDER — SODIUM CHLORIDE, SODIUM LACTATE, POTASSIUM CHLORIDE, CALCIUM CHLORIDE 600; 310; 30; 20 MG/100ML; MG/100ML; MG/100ML; MG/100ML
125 INJECTION, SOLUTION INTRAVENOUS CONTINUOUS
Status: DISCONTINUED | OUTPATIENT
Start: 2020-06-15 | End: 2020-06-16 | Stop reason: HOSPADM

## 2020-06-15 RX ORDER — NALOXONE HCL 0.4 MG/ML
0.1 VIAL (ML) INJECTION
Status: DISCONTINUED | OUTPATIENT
Start: 2020-06-15 | End: 2020-06-16 | Stop reason: HOSPADM

## 2020-06-15 RX ORDER — LANOLIN 100 %
OINTMENT (GRAM) TOPICAL
Status: DISCONTINUED | OUTPATIENT
Start: 2020-06-15 | End: 2020-06-16 | Stop reason: HOSPADM

## 2020-06-15 RX ADMIN — ONDANSETRON 4 MG: 2 SOLUTION INTRAMUSCULAR; INTRAVENOUS at 01:33

## 2020-06-15 RX ADMIN — PRENATAL VIT W/ FE FUMARATE-FA TAB 27-0.8 MG 1 TABLET: 27-0.8 TAB at 09:32

## 2020-06-15 RX ADMIN — DIPHENHYDRAMINE HYDROCHLORIDE 25 MG: 25 CAPSULE ORAL at 01:02

## 2020-06-15 RX ADMIN — KETOROLAC TROMETHAMINE 15 MG: 15 INJECTION, SOLUTION INTRAMUSCULAR; INTRAVENOUS at 09:32

## 2020-06-15 RX ADMIN — ACETAMINOPHEN 1000 MG: 500 TABLET ORAL at 21:52

## 2020-06-15 RX ADMIN — KETOROLAC TROMETHAMINE 15 MG: 15 INJECTION, SOLUTION INTRAMUSCULAR; INTRAVENOUS at 16:54

## 2020-06-15 RX ADMIN — CEFAZOLIN 2 G: 10 INJECTION, POWDER, FOR SOLUTION INTRAVENOUS; PARENTERAL at 16:06

## 2020-06-15 RX ADMIN — ACETAMINOPHEN 1000 MG: 500 TABLET ORAL at 13:28

## 2020-06-15 RX ADMIN — PROMETHAZINE HYDROCHLORIDE 12.5 MG: 25 INJECTION INTRAMUSCULAR; INTRAVENOUS at 03:17

## 2020-06-15 RX ADMIN — Medication 1 APPLICATION: at 04:25

## 2020-06-15 RX ADMIN — KETOROLAC TROMETHAMINE 15 MG: 15 INJECTION, SOLUTION INTRAMUSCULAR; INTRAVENOUS at 03:17

## 2020-06-15 RX ADMIN — OXYTOCIN-SODIUM CHLORIDE 0.9% IV SOLN 30 UNIT/500ML 85 ML/HR: 30-0.9/5 SOLUTION at 03:16

## 2020-06-15 RX ADMIN — KETOROLAC TROMETHAMINE 15 MG: 15 INJECTION, SOLUTION INTRAMUSCULAR; INTRAVENOUS at 21:52

## 2020-06-15 RX ADMIN — CEFAZOLIN 2 G: 10 INJECTION, POWDER, FOR SOLUTION INTRAVENOUS; PARENTERAL at 06:29

## 2020-06-15 RX ADMIN — SODIUM CHLORIDE, POTASSIUM CHLORIDE, SODIUM LACTATE AND CALCIUM CHLORIDE 125 ML/HR: 600; 310; 30; 20 INJECTION, SOLUTION INTRAVENOUS at 09:35

## 2020-06-15 NOTE — OP NOTE
Section Procedure Note    Ella Jeffries    2020     Indications: previous  section low transverse    Pre-operative Diagnosis: 30-year-old -0-1-1 at 36 weeks and 1 day with premature  rupture of membranes desiring repeat low transverse  section    Post-operative Diagnosis: Same as above    PROCEDURES PERFORMED: Procedure(s):   SECTION REPEAT    SURGEON: Ron Guerra DO, FACOG    ASSISTANT: Jemima Maurer CSA    STAFF:     ANESTHESIA: Spinal    ANESTHESIA STAFF:  CRNA: Benitez Rico CRNA    Findings: Normal-appearing uterus tubes and ovaries moderate amount of adhesive disease between the bladder and lower uterine segment.  Infant in the cephalic presentation very large cystic umbilical cord    Birth Information  YOB: 2022   Time of birth: 11:06 PM   Delivering clinician:     Sex: male   Delivery type:     Breech type (if applicable):     Observed anomalies/comments:         Estimated Blood Loss:  800mL           Drains: Galicia                 Specimens: Placenta               Complications:  None; patient tolerated the procedure well.           Disposition: Mother Baby Unit           Condition: stable    Procedure Details     After obtaining informed consent, the patient was taken to the operating room where spinal anesthesia was found to be adequate. Preoperative antibiotics were given.  A Galicia catheter and bilateral sequential compression devices were placed.  She was then prepped and draped in the normal sterile fashion in the dorsal supine position with a left lateral tilt. A final time out was performed. A Pfannenstiel skin incision was then made with the scalpel and carried through to the underlying layer of fascia.  The fascia was incised in the midline and the incision extended laterally with Bovie.  The rectus muscles were then dissected off of the fascia superiorly and inferiorly.  The rectus muscles were then  in the  midline, and the peritoneum was entered digitally.   The peritoneal incision was then extended superiorly and inferiorly with Bovie with good visualization of the bladder. Kalyan retractor was placed without difficulty.    The lower uterine segment scored in a transverse fashion with the scalpel.  The uterus was then entered bluntly and the incision extended w/ traction.  The bladder blade was removed and the infant’s head was elevated to the level of the incision.  Fundal pressure was applied. The head was delivered atraumatically in OA position.  The anterior shoulder, posterior shoulder, and corpus were delivered without difficulty. The infant was handed off to waiting care team.      The placenta was then removed manually, the uterus exteriorized, and cleared of all clots and debris.  The uterine incision was repaired with 0 Vicryl in a running, locking fashion.  An imbricating layer was then performed with #1 chromic.  The uterine incision was inspected and hemostasis was noted. Posterior cul-de-sac was suctioned and uterus returned to the abdomen.  The gutters were cleared of all clots  with excellent hemostasis noted.  The fascia was reapproximated with 0 Vicryl in a running fashion. The skin was closed using 3-0 Monocryl suture.    The patient tolerated the procedure well.  Sponge, lap, and needle counts were correct times two. Vaginal sweep was completed.  The patient was taken to the recovery room in stable condition.      This document has been electronically signed by Ron Guerra DO on June 14, 2020 23:38

## 2020-06-15 NOTE — PROGRESS NOTES
[facility]  Ella Jeffries  : 1990  MRN: [id number]  CSN: [id number]    Post-operative Day #1  Subjective   Her pain is well controlled.  Vaginal bleeding is improving.  She is not passing gas and has not had a bowel movement. Patient still has a de guzman in place. Urine in de guzman is clear and yellow. Patient is overall feeling well.     Objective     Min/max vitals past 24 hours:   Temp  Min: 97 °F (36.1 °C)  Max: 98.6 °F (37 °C)  BP  Min: 99/62  Max: 133/77  Pulse  Min: 62  Max: 89  Pulse  Min: 62  Max: 89        General: well developed; well nourished  no acute distress   Abdomen: no umbilical or inguinal hernias are present  no hepato-splenomegaly  incision is clean, dry, intact and without drainage  tender to palpation. Tenderness essential absent without palpation   Pelvic: Not performed   Ext: Calves NT     Lab Results   Component Value Date    WBC 6.75 2020    HGB 12.7 2020    HCT 36.2 2020    MCV 89.4 2020     2020    RH Positive 2020    HEPBSAG Non-Reactive 2019        Assessment   1. POD #1 S/P  delivery   2. Healing and improving appropriately     Plan   1. Continue routine post-operative care  2. Ambulate  3. Advance diet          This document has been electronically signed by Dao Foley MD on Cherry 15, 2020 07:02      EMR Dragon/Transcription disclaimer:   Much of this encounter note is an electronic transcription/translation of spoken language to printed text. The electronic translation of spoken language may permit erroneous, or at times, nonsensical words or phrases to be inadvertently transcribed; Although I have reviewed the note for such errors, some may still exist.

## 2020-06-15 NOTE — PLAN OF CARE
Problem: Patient Care Overview  Goal: Plan of Care Review  Outcome: Ongoing (interventions implemented as appropriate)  Flowsheets (Taken 6/15/2020 0014)  Progress: improving  Plan of Care Reviewed With: patient; spouse  Outcome Summary: pt delivered via repeat c/s after srom at 2045

## 2020-06-15 NOTE — PLAN OF CARE
Patient was able to latch baby for first time today for 10 minutes, feeding plan gone over with patient and FOB

## 2020-06-15 NOTE — ANESTHESIA PREPROCEDURE EVALUATION
Anesthesia Evaluation     Patient summary reviewed and Nursing notes reviewed   NPO Solid Status: > 4 hours  NPO Liquid Status: > 2 hours           Airway   No difficulty expected  Dental      Pulmonary - negative pulmonary ROS   Cardiovascular - negative cardio ROS        Neuro/Psych  (+) headaches, psychiatric history Anxiety,     GI/Hepatic/Renal/Endo    (+)   diabetes mellitus gestational using insulin,     Musculoskeletal     Abdominal    Substance History      OB/GYN    (+) Pregnant,         Other                        Anesthesia Plan    ASA 2 - emergent     spinal       Anesthetic plan, all risks, benefits, and alternatives have been provided, discussed and informed consent has been obtained with: patient and spouse/significant other.

## 2020-06-15 NOTE — H&P
[facility]  Obstetric History and Physical    Chief complaint: Rupture of membranes        Patient is a 30 y.o. female  currently at 36w1d, who presents with complaint of rupture of membranes that occurred 1 to 2 hours ago.  States that fluid was clear and came out in big gush.  Is having some irregular contractions at this time.  Denies any vaginal bleeding or loss of fluid at this time.    The risks, benefits. and alternatives to  section were discussed.  The risks that were discussed included, but were not limited to, pain, infection, bleeding, hemorrhage,  injury to the bowel, bladder, uterus, tubes, ovaries, or baby which could require further surgery or prolonged hospitalization.  Remote possibility of hysterectomy and/or salpingo-oophorectomy. Remote possibility of death of baby and/or mother. I discussed the risk of bleeding with the patient and possible risk of blood transfusion.  Blood products carry risk of HIV, infection, hepatitis, and transfusion reaction. Patient is willing to accept blood products. The patient understands that SCD's will be placed on her legs to try and reduce the risk of clot formation in her legs.  She understands that we will have early ambulation to also reduce the risk of blood clot formation and to reduce the risk of pneumonia.  She will be given antibiotics prior to her surgery to help decrease the risk for infection.  All questions were answered. Patient express understanding and desires to proceed with surgery.    Her prenatal care is complicated by history of previous  section, A2 GDM controlled on insulin.  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  Marijuana use during pregnancy     Obstetric History   #: 1, Date: 10/24/06, Sex: Female, Weight: 3260 g (7 lb 3 oz), GA: 41w1d, Delivery: , Low Transverse, Apgar1: None, Apgar5: None, Living: Living, Birth Comments: None    #: 2, Date: 2018, Sex: None, Weight: None, GA:  None, Delivery: None, Apgar1: None, Apgar5: None, Living: None, Birth Comments: approximately 9-11 wks gestation    #: 3, Date: None, Sex: None, Weight: None, GA: None, Delivery: None, Apgar1: None, Apgar5: None, Living: None, Birth Comments: None       The following portions of the patients history were reviewed and updated as appropriate: current medications, allergies, past medical history, past surgical history, past family history, past social history and problem list .       Prenatal Information:  Prenatal Results     POC Urine Glucose/Protein     Test Value Reference Range Date Time    Urine Glucose Negative mg/dL Negative, 1000 mg/dL (3+) 08/10/17 1457    Urine Protein 1+ mg/dL Negative 08/10/17 1457          Initial Prenatal Labs     Test Value Reference Range Date Time    Hemoglobin 12.0 g/dL 12.0 - 15.9 12/11/19 1103    Hematocrit 35.5 % 34.0 - 46.6 12/11/19 1103    Platelets 247 10*3/mm3 140 - 450 04/07/20 1003      295 10*3/mm3 140 - 450 12/11/19 1103    Rubella IgG Positive   12/11/19 1103    Hepatitis B SAg Non-Reactive  Non-Reactive 12/11/19 1103    Hepatitis C Ab Non-Reactive  Non-Reactive 12/11/19 1103    RPR Non-Reactive  Non-Reactive 12/11/19 1103    ABO A   12/11/19 1103    Rh Positive   12/11/19 1103    Antibody Screen Negative   12/11/19 1103    HIV Non-Reactive  Non-Reactive 12/11/19 1103    Urine Culture >100,000 CFU/mL Mixed Mary Anne Isolated   01/17/20 0919      >100,000 CFU/mL Lactobacillus species   12/11/19 1103    Gonorrhea Negative  Negative 12/11/19 1103    Chlamydia Negative  Negative 12/11/19 1103    TSH              2nd and 3rd Trimester     Test Value Reference Range Date Time    Hemoglobin (repeated) 11.7 g/dL 12.0 - 15.9 04/07/20 1003    Hematocrit (repeated) 33.3 % 34.0 - 46.6 04/07/20 1003     mg/dL 74 - 180 04/13/20 0925      203 mg/dL 60 - 140 04/07/20 1003      161 mg/dL 74 - 180 12/26/19 0826      145 mg/dL 60 - 140 12/20/19 0950    Antibody Screen (repeated)         GTT Fasting        GTT 1 Hr        GTT 2 Hr        GTT 3 Hr        Group B Strep              Drug Screening     Test Value Reference Range Date Time    Amphetamine Screen Negative  Negative 01/17/20 0919      Negative  Negative 12/11/19 1103    Barbiturate Screen Negative  Negative 01/17/20 0919      Negative  Negative 12/11/19 1103    Benzodiazepine Screen Negative  Negative 01/17/20 0919      Negative  Negative 12/11/19 1103    Methadone Screen Negative  Negative 01/17/20 0919      Negative  Negative 12/11/19 1103    Phencyclidine Screen Negative  Negative 01/17/20 0919      Negative  Negative 12/11/19 1103    Opiates Screen Negative  Negative 01/17/20 0919      Negative  Negative 12/11/19 1103    THC Screen Positive  Negative 01/17/20 0919      Positive  Negative 12/11/19 1103    Cocaine Screen Negative  Negative 01/17/20 0919      Negative  Negative 12/11/19 1103    Propoxyphene Screen Negative  Negative 01/17/20 0919      Negative  Negative 12/11/19 1103    Buprenorphine Screen Negative  Negative 01/17/20 0919      Negative  Negative 12/11/19 1103    Methamphetamine Screen Negative  Negative 01/17/20 0919      Negative  Negative 12/11/19 1103    Oxycodone Screen Negative  Negative 01/17/20 0919      Negative  Negative 12/11/19 1103    Tricyclic Antidepressants Screen Negative  Negative 01/17/20 0919      Negative  Negative 12/11/19 1103          Other (Risk screening)     Test Value Reference Range Date Time    Varicella IgG        Parvovirus IgG        CMV IgG        Cystic Fibrosis        Hemoglobin electrophoresis        NIPT        MSAFP-4        AFP (for NTD only)                  External Prenatal Results     Pregnancy Outside Results - Transcribed From Office Records - See Scanned Records For Details     Test Value Date Time    Hgb 11.7 g/dL 04/07/20 1003      12.0 g/dL 12/11/19 1103    Hct 33.3 % 04/07/20 1003      35.5 % 12/11/19 1103    ABO A  12/11/19 1103    Rh Positive  12/11/19 1103    Antibody  Screen Negative  19 1103    Glucose Fasting GTT       Glucose Tolerance Test 1 hour       Glucose Tolerance Test 3 hour       Gonorrhea (discrete) Negative  19 1103    Chlamydia (discrete) Negative  19 1103    RPR Non-Reactive  19 1103    VDRL       Syphilis Antibody       Rubella Positive  19 1103    HBsAg Non-Reactive  19 1103    Herpes Simplex Virus PCR       Herpes Simplex VIrus Culture       HIV Non-Reactive  19 1103    Hep C RNA Quant PCR       Hep C Antibody Non-Reactive  19 1103    AFP       Group B Strep       GBS Susceptibility to Clindamycin       GBS Susceptibility to Erythromycin       Fetal Fibronectin       Genetic Testing, Maternal Blood             Drug Screening     Test Value Date Time    Urine Drug Screen       Amphetamine Screen Negative  20 0919      Negative  19 1103    Barbiturate Screen Negative  20 0919      Negative  19 1103    Benzodiazepine Screen Negative  20 0919      Negative  19 1103    Methadone Screen Negative  20 0919      Negative  19 1103    Phencyclidine Screen Negative  20 0919      Negative  19 1103    Opiates Screen Negative  20 0919      Negative  19 1103    THC Screen Positive  20 0919      Positive  19 1103    Cocaine Screen       Propoxyphene Screen Negative  20 0919      Negative  19 1103    Buprenorphine Screen Negative  20 0919      Negative  19 1103    Methamphetamine Screen       Oxycodone Screen Negative  20 0919      Negative  19 1103    Tricyclic Antidepressants Screen Negative  20 0919      Negative  19 1103                 Past OB History:     OB History    Para Term  AB Living   3 1 1 0 1 1   SAB TAB Ectopic Molar Multiple Live Births   1 0 0 0 0 1      # Outcome Date GA Lbr Shekhar/2nd Weight Sex Delivery Anes PTL Lv   3 Current            2 SAB 2018              Birth  "Comments: approximately 9-11 wks gestation   1 Term 10/24/06 41w1d  3260 g (7 lb 3 oz) F CS-LTranv EPI  MAVERICK      Complications: Chorioamnionitis, Meconium in amniotic fluid, Failed induction of labor, Arrested active phase of labor, Cephalopelvic disproportion        ALLERGIES:   No Known Allergies     Home Medications:     Prior to Admission medications    Medication Sig Start Date End Date Taking? Authorizing Provider   Alcohol Swabs (ALCOHOL PREP) pads Use prior to blood glucose monitoring 20   Kasandra Wesley MD   glucose blood test strip Use as instructed for blood glucose monitoring: in AM (Fasting) & 1hr after meals 20   Kasandra Wesley MD   glucose monitor monitoring kit Use twice daily for blood glucose monitoring (fasting & 1hr after meals) 20   Kasandra Wesley MD   insulin NPH (humuLIN N,novoLIN N) 100 UNIT/ML injection Inject 22 Units under the skin into the appropriate area as directed Every Night. 20   Kasandra Wesley MD   Lancets misc As directed twice daily for blood glucose monitoring: in AM (fasting) & 1hr after meals 20   Kasandra Wesley MD   Prenatal Vit-Fe Fumarate-FA (PRENATAL VITAMIN PO) Take  by mouth.    Provider, MD Cristi   Syringe/Needle, Disp, (Safety Syringes/Needle) 22G X 1-1/2\" 5 ML misc 1 each as directed for insulin administration 20   Kasandra Wesley MD       Past Medical History: Past Medical History:   Diagnosis Date   • Chlamydia    • Gestational diabetes mellitus (GDM) in third trimester 2020   • Migraine    • Urogenital trichomoniasis       Past Surgical History Past Surgical History:   Procedure Laterality Date   •  SECTION      Low Transverse 10/24/2006      Family History: Family History   Problem Relation Age of Onset   • Diabetes Mother    • Heart attack Mother    • Hypertension Mother    • Hyperlipidemia Mother    • No Known Problems Daughter    • No Known Problems Father    • No " Known Problems Brother    • Diabetes Sister    • Diabetes Paternal Grandmother    • Diabetes Maternal Grandmother    • Stroke Maternal Grandfather    • No Known Problems Sister       Social History:  reports that she quit smoking about 2 years ago. Her smoking use included cigarettes. She started smoking about 16 years ago. She has a 7.00 pack-year smoking history. She has never used smokeless tobacco.   reports that she drank alcohol.   reports that she does not use drugs.        Review of Systems                                                                                                                  Neuro no history of brain tumor    HENT no history of ear tumors    Eye no history of retinal tumors    Pulmonary no history of lung tumors    Cardiac no history of cardiac tumors    GI: No history of small bowel tumors    Musculoskeletal: No history of skeletal muscle tumors    Endocrine: No history of adrenal tumors    Lymphatic: No history of Hodgkin's disease    Renal: No history of renal cancer      Objective     There were no vitals filed for this visit.    OBGyn Exam  Constitutional: Appears to be in no acute distress; Eyes: sclera normal; Endocrine system: thyroid palpate is normal; Pulmonary system: lungs clear; Cardiovascular system: heart regular rate and rhythm; Gastrointestinal system: abdomen soft nontender, active bowel sounds; Urologic system: CVA negative; Psychiatric: appropriate insight; Neurologic: gait within normal limits, gross rupture of membranes noted on exam.  Cervix exam was difficult and cervix was not able to be reached.  Fetus with a category 1 heart rate tracing at this time.      Last Labs  Lab Results   Component Value Date    WBC 9.10 04/07/2020    RBC 3.67 (L) 04/07/2020    HGB 11.7 (L) 04/07/2020    HCT 33.3 (L) 04/07/2020    MCV 90.7 04/07/2020    MCH 31.9 04/07/2020    MCHC 35.1 04/07/2020    RDW 12.6 04/07/2020    RDWSD 41.8 04/07/2020    MPV 11.7 04/07/2020      2020        Lab Results   Component Value Date    GLUCOSE 98 07/10/2015    BUN 6 (L) 07/10/2015    CREATININE 0.6 07/10/2015     07/10/2015    K 4.2 07/10/2015     07/10/2015    CO2 26 07/10/2015    CALCIUM 9.2 07/10/2015    ANIONGAP 11.0 07/10/2015       No results found for: HCGQUAL      Assessment/Plan:  1. 30 y.o. [id number].  Patient with  rupture of membranes.  Fetal monitoring currently reassuring.  Patient has history of previous  section desires repeat today.  Plan for routine repeat  section and routine postoperative care.      Ella Jeffries and I have discussed pain goals for this hospitalization after reviewing her current clinical condition, medical history and prior pain experiences.  The goal is to keep her pain level tolerable.  To help achieve this, I plan to give spinal during  p.o. pain medication for pain control afterwards..           This document has been electronically signed by Ron Guerra DO on 2020 22:12

## 2020-06-15 NOTE — ANESTHESIA PROCEDURE NOTES
Spinal Block      Patient reassessed immediately prior to procedure    Patient location during procedure: OB  Start Time: 6/14/2020 10:35 PM  Stop Time: 6/14/2020 10:44 PM  Indication:at surgeon's request and post-op pain management  Performed By  CRNA: Benitez Rico CRNA  Preanesthetic Checklist  Completed: patient identified, site marked, surgical consent, pre-op evaluation, timeout performed, IV checked, risks and benefits discussed and monitors and equipment checked  Spinal Block Prep:  Patient Position:sitting  Sterile Tech:cap, gloves, mask and sterile barriers  Prep:Chloraprep  Patient Monitoring:blood pressure monitoring, continuous pulse oximetry and EKG  Spinal Block Procedure  Approach:midline  Guidance:landmark technique and palpation technique  Location:L3-L4  Needle Type:Pencan  Needle Gauge:24 G  Placement of Spinal needle event:cerebrospinal fluid aspirated  Paresthesia: no  Fluid Appearance:clear  Medications: bupivacaine PF (MARCAINE) 0.75 % injection, 1.6 mL  Med Administered at 6/14/2020 10:43 PM   Post Assessment  Patient Tolerance:patient tolerated the procedure well with no apparent complications  Complications no

## 2020-06-15 NOTE — LACTATION NOTE
This note was copied from a baby's chart.  Went over feeding plan with mom and dad, mom to attempt to latch baby every few hours, work with baby for 10 minutes then move onto supplementing if baby is not interested in nursing, if latches then allow baby to nurse for 15 minutes then move on to supplementing per NICU order of amount. Mom to pump after each feed at this time.

## 2020-06-15 NOTE — PLAN OF CARE
Problem: Patient Care Overview  Goal: Plan of Care Review  Outcome: Ongoing (interventions implemented as appropriate)  Flowsheets (Taken 6/15/2020 5455)  Progress: improving  Plan of Care Reviewed With: patient  Outcome Summary: vss, lochia small, pt stood at bedside, pain controlled with duramorph, nausea relieved, pumping

## 2020-06-15 NOTE — ANESTHESIA POSTPROCEDURE EVALUATION
Patient: Ella Jeffries    Procedure Summary     Date:  20 Room / Location:  [facility] LABOR DELIVERY    Anesthesia Start:   Anesthesia Stop:      Procedure:   SECTION REPEAT (N/A Abdomen) Diagnosis:      Surgeon:  Ron Guerra DO Provider:  Benitez Rico CRNA    Anesthesia Type:  spinal ASA Status:  2 - Emergent          Anesthesia Type: spinal    Vitals  No vitals data found for the desired time range.          Post Anesthesia Care and Evaluation    Patient location during evaluation: bedside  Patient participation: complete - patient participated  Level of consciousness: awake and alert  Pain score: 0  Pain management: adequate  Airway patency: patent  Anesthetic complications: No anesthetic complications  PONV Status: none  Cardiovascular status: acceptable  Respiratory status: acceptable and spontaneous ventilation  Hydration status: acceptable  Post Neuraxial Block status: No signs or symptoms of PDPH  Comments: Motor and sensory function still returning to baseline. Current sensory dermatome level is T10

## 2020-06-15 NOTE — L&D DELIVERY NOTE
Patient presented with  rupture of membranes underwent uncomplicated repeat low transverse  section please see op report for full details.

## 2020-06-15 NOTE — PLAN OF CARE
Problem: Patient Care Overview  Goal: Plan of Care Review  Outcome: Ongoing (interventions implemented as appropriate)  Flowsheets  Taken 6/15/2020 1830  Progress: improving  Outcome Summary: VSS; lochia light, fundus firm.  patient ambulating in room, riding in wheelchair to NICu.  Pain controlled with duramorph and scheduled meds.  Pumping and nursing infant.  Taken 6/15/2020 1328  Plan of Care Reviewed With: patient

## 2020-06-16 VITALS
SYSTOLIC BLOOD PRESSURE: 112 MMHG | BODY MASS INDEX: 33.13 KG/M2 | HEART RATE: 85 BPM | HEIGHT: 62 IN | WEIGHT: 180 LBS | RESPIRATION RATE: 18 BRPM | DIASTOLIC BLOOD PRESSURE: 69 MMHG | TEMPERATURE: 97.9 F | OXYGEN SATURATION: 99 %

## 2020-06-16 DIAGNOSIS — O36.8990 UMBILICAL CORD CYST DURING PREGNANCY, ANTEPARTUM: ICD-10-CM

## 2020-06-16 PROCEDURE — 99024 POSTOP FOLLOW-UP VISIT: CPT | Performed by: OBSTETRICS & GYNECOLOGY

## 2020-06-16 RX ORDER — OXYCODONE AND ACETAMINOPHEN 7.5; 325 MG/1; MG/1
1 TABLET ORAL EVERY 6 HOURS PRN
Qty: 20 TABLET | Refills: 0 | Status: SHIPPED | OUTPATIENT
Start: 2020-06-16

## 2020-06-16 RX ORDER — IBUPROFEN 800 MG/1
800 TABLET ORAL EVERY 8 HOURS SCHEDULED
Qty: 60 TABLET | Refills: 2 | Status: SHIPPED | OUTPATIENT
Start: 2020-06-16

## 2020-06-16 RX ADMIN — OXYCODONE HYDROCHLORIDE 5 MG: 5 TABLET ORAL at 10:50

## 2020-06-16 RX ADMIN — OXYCODONE HYDROCHLORIDE 5 MG: 5 TABLET ORAL at 03:01

## 2020-06-16 RX ADMIN — IBUPROFEN 800 MG: 800 TABLET ORAL at 06:35

## 2020-06-16 RX ADMIN — ACETAMINOPHEN 1000 MG: 500 TABLET ORAL at 03:01

## 2020-06-16 RX ADMIN — PRENATAL VIT W/ FE FUMARATE-FA TAB 27-0.8 MG 1 TABLET: 27-0.8 TAB at 10:46

## 2020-06-16 NOTE — PROGRESS NOTES
[facility]  Ella Jeffries  : 1990  MRN: [id number]  CSN: [id number]    Post-operative Day #2  Subjective   Her pain is well controlled.  Vaginal bleeding is less than a normal period.  She is passing gas and has had a bowel movement. Patient is urinating without difficulty. Patient is overall feeling well.     Objective     Min/max vitals past 24 hours:   Temp  Min: 97.9 °F (36.6 °C)  Max: 99.1 °F (37.3 °C)  BP  Min: 109/69  Max: 123/73  Pulse  Min: 78  Max: 89  Pulse  Min: 78  Max: 89        General: well developed; well nourished  no acute distress   Abdomen: no umbilical or inguinal hernias are present  no hepato-splenomegaly  incision is clean, dry, intact and without drainage  tender to palpation. Tenderness essential absent without palpation   Pelvic: Not performed   Ext: Calves NT     Lab Results   Component Value Date    WBC 11.34 (H) 06/15/2020    HGB 11.3 (L) 06/15/2020    HCT 33.4 (L) 06/15/2020    MCV 90.5 06/15/2020     06/15/2020    RH Positive 2020    HEPBSAG Non-Reactive 2019        Assessment   1. POD #2 S/P  delivery   2. Healing and improving appropriately     Plan   1. Continue routine post-operative care  2. Ambulate  3. Advance diet   4. Possible discharge later today          This document has been electronically signed by Dao Foley MD on 2020 06:18      EMR Dragon/Transcription disclaimer:   Much of this encounter note is an electronic transcription/translation of spoken language to printed text. The electronic translation of spoken language may permit erroneous, or at times, nonsensical words or phrases to be inadvertently transcribed; Although I have reviewed the note for such errors, some may still exist.

## 2020-06-16 NOTE — DISCHARGE SUMMARY
[facility]  Ella Jeffries  : 1990  MRN: [id number]  CSN: [id number]    Discharge Summary    Date of Admission: 2020  Date of Discharge: 2020    Principle Discharge Dx:  1.  Status post primary low transverse  section secondary to  rupture of membranes    Procedures Performed:  Procedure(s):   SECTION REPEAT    Brief History:  Patient is a 30 y.o. now  who presented to labor and delivery with complaint of leakage of fluid was found to have ruptured membranes.  Patient desiring repeat  was taken for the same secondary to  rupture of membranes.  Had uncomplicated  section and uncomplicated postpartum course.    Hospital Course:  Her post-operative course was unremarkable.  On POD # 2 she expressed the desire for discharge. She had no febrile morbidity. She had passed gas, and was urinating normally. She was eating a regular diet without difficulty. She was ambulating well. Her incision was clean, dry and intact. Discharge instructions were given.  All questions were answered.    Condition:  Stable  Discharge Activity:    Activity Instructions     Bathing Restrictions      Type of Restriction:  Bathing    Bathing Restrictions:  Other    Explain Bathing Restrictions:  No soaking in bathtub for 4 weeks, showers are fine.    Driving Restrictions      Type of Restriction:  Driving    Driving Restrictions:  No Driving (Time Limited)    Length:  Other    Indicate Length of Restriction:  No driving for 1 week or while on narcotic pain medications. Riding is car is fine.    Lifting Restrictions      Type of Restriction:  Lifting    Lifting Restrictions:  Other    Explain Lifing Restrictions:  No lifting more than infant and baby carrier together for 6 weeks.    Pelvic Rest      Nothing in the vagina for 6 weeks to include tampons or intercourse.    Sexual Activity Restrictions      Type of Restriction:  Sex    Explain Sexual Activity  "Restrictions:  No sexual intercourse for at least 6 weeks        Discharge Diet:  Regular  Discharge Medications:       Your medication list      START taking these medications      Instructions Last Dose Given Next Dose Due   ibuprofen 800 MG tablet  Commonly known as:  ADVIL,MOTRIN      Take 1 tablet by mouth Every 8 (Eight) Hours.       oxyCODONE-acetaminophen 7.5-325 MG per tablet  Commonly known as:  Percocet      Take 1 tablet by mouth Every 6 (Six) Hours As Needed for Moderate Pain .          CONTINUE taking these medications      Instructions Last Dose Given Next Dose Due   Alcohol Prep pads      Use prior to blood glucose monitoring       glucose blood test strip      Use as instructed for blood glucose monitoring: in AM (Fasting) & 1hr after meals       glucose monitor monitoring kit      Use twice daily for blood glucose monitoring (fasting & 1hr after meals)       insulin  UNIT/ML injection  Commonly known as:  humuLIN N,novoLIN N      Inject 22 Units under the skin into the appropriate area as directed Every Night.       Lancets misc      As directed twice daily for blood glucose monitoring: in AM (fasting) & 1hr after meals       PRENATAL VITAMIN PO      Take  by mouth.       Syringe/Needle (Disp) 22G X 1-1/2\" 5 ML misc  Commonly known as:  Safety Syringes/Needle      1 each as directed for insulin administration             Where to Get Your Medications      These medications were sent to [facility] - [place] [id number]    Hours:  Monday through Friday 7:00am to 5:00pm Phone:  [phone]   · ibuprofen 800 MG tablet  · oxyCODONE-acetaminophen 7.5-325 MG per tablet       Discharge Disposition:  Home  Follow-Up:  No future appointments.      This document has been electronically signed by Ron Guerra DO on June 16, 2020 07:30          "

## 2020-06-16 NOTE — PLAN OF CARE
Problem: Patient Care Overview  Goal: Plan of Care Review  Outcome: Ongoing (interventions implemented as appropriate)  Flowsheets  Taken 6/15/2020 1837 by Zuly Ewing, RN  Progress: improving  Taken 6/15/2020 1328 by Zuly Ewing, RN  Plan of Care Reviewed With: patient  Taken 6/16/2020 3552 by Mariza Lea, RN  Outcome Summary: vss; fundus firm; lochia light; pt ambulating in room; voids spontaneously without difficulty; pt reports pain control with scheduled and PRN pain meds; bonding appropriately with infant

## 2020-06-16 NOTE — LACTATION NOTE
This note was copied from a baby's chart.  Unable to see infant latch today. I suspect infant is not really latching at all. Mother has a pump at home. Pumping instructions and supplementing instructions given to her on paper. Follow up lactation appointment is scheduled for Monday. Mother and father verbalized understanding.

## 2020-06-18 DIAGNOSIS — F12.10 TETRAHYDROCANNABINOL (THC) USE DISORDER, MILD, ABUSE: Primary | ICD-10-CM

## 2020-06-18 DIAGNOSIS — O36.8990 UMBILICAL CORD CYST DURING PREGNANCY, ANTEPARTUM: ICD-10-CM

## 2020-06-18 LAB
LAB AP CASE REPORT: NORMAL
LAB AP CLINICAL INFORMATION: NORMAL
PATH REPORT.FINAL DX SPEC: NORMAL

## 2020-06-22 DIAGNOSIS — F12.10 TETRAHYDROCANNABINOL (THC) USE DISORDER, MILD, ABUSE: ICD-10-CM

## 2020-06-22 DIAGNOSIS — O36.8990 UMBILICAL CORD CYST DURING PREGNANCY, ANTEPARTUM: ICD-10-CM

## 2020-06-23 ENCOUNTER — OFFICE VISIT (OUTPATIENT)
Dept: OBSTETRICS AND GYNECOLOGY | Facility: CLINIC | Age: 30
End: 2020-06-23

## 2020-06-23 ENCOUNTER — TELEPHONE (OUTPATIENT)
Dept: LACTATION | Facility: HOSPITAL | Age: 30
End: 2020-06-23

## 2020-06-23 ENCOUNTER — RESULTS ENCOUNTER (OUTPATIENT)
Dept: OBSTETRICS AND GYNECOLOGY | Facility: CLINIC | Age: 30
End: 2020-06-23

## 2020-06-23 VITALS
WEIGHT: 162.8 LBS | BODY MASS INDEX: 29.96 KG/M2 | DIASTOLIC BLOOD PRESSURE: 90 MMHG | HEIGHT: 62 IN | SYSTOLIC BLOOD PRESSURE: 124 MMHG

## 2020-06-23 DIAGNOSIS — O36.8990 UMBILICAL CORD CYST DURING PREGNANCY, ANTEPARTUM: ICD-10-CM

## 2020-06-23 DIAGNOSIS — Z98.891 STATUS POST REPEAT LOW TRANSVERSE CESAREAN SECTION: Primary | ICD-10-CM

## 2020-06-23 DIAGNOSIS — Z09 POSTOPERATIVE FOLLOW-UP: ICD-10-CM

## 2020-06-23 PROCEDURE — 99024 POSTOP FOLLOW-UP VISIT: CPT | Performed by: OBSTETRICS & GYNECOLOGY

## 2020-06-23 NOTE — PROGRESS NOTES
"Chief complaint: Postpartum visit    SUBJECTIVE:   Pt is a 30 y.o.  now s/p repeat low transverse  section. Pt denies fever, abdominal pain, n/v/d/c, VB, Pt currently bottle feeding and taking PNV, ambulating without difficulty, urinating and stooling without complaints and tolerating normal diet.  Denies any depression issues at this time.  OBJECTIVE:  /90   Ht 157.5 cm (62\")   Wt 73.8 kg (162 lb 12.8 oz)   LMP 10/05/2019   BMI 29.78 kg/m²     Review of Systems   Constitutional: Negative for chills, fatigue and fever.   HENT: Negative for sore throat.    Eyes: Negative for visual disturbance.   Respiratory: Negative for cough, shortness of breath and wheezing.    Cardiovascular: Negative for chest pain, palpitations and leg swelling.   Gastrointestinal: Negative for abdominal pain, diarrhea, nausea and vomiting.   Genitourinary: Negative for dysuria, flank pain, frequency, vaginal bleeding, vaginal discharge and vaginal pain.   Neurological: Negative for syncope, light-headedness and headaches.   Psychiatric/Behavioral: Negative for dysphoric mood and suicidal ideas. The patient is not nervous/anxious.        Physical Exam   Constitutional: She is oriented to person, place, and time. She appears well-developed and well-nourished. No distress.   HENT:   Head: Normocephalic.   Abdominal: Soft. She exhibits no distension. There is no tenderness. There is no rebound and no guarding.   Appropriately healing Pfannenstiel incision dressing removed without difficulty no erythema or evidence of infection.   Musculoskeletal: Normal range of motion.   Neurological: She is alert and oriented to person, place, and time.   Skin: Skin is warm and dry. She is not diaphoretic.   Psychiatric: She has a normal mood and affect. Her behavior is normal. Judgment and thought content normal.   Nursing note and vitals reviewed.      ASSESSMENT:    Pt is a 30 y.o.  s/p repeat low transverse  section " doing well recovering appropriately at this time.  PLAN:   1.  We will discuss what she wants for contraception at next visit  2. Pt to continue to increase exercise/daily activities as tolerated   3. Continue prenatal vitamins   4. f/u 4 to 5 weeks    Med reconciliation completed.        This document has been electronically signed by Ron Guerra DO on June 23, 2020 13:51

## 2020-06-26 ENCOUNTER — RESULTS ENCOUNTER (OUTPATIENT)
Dept: OBSTETRICS AND GYNECOLOGY | Facility: CLINIC | Age: 30
End: 2020-06-26

## 2020-06-26 DIAGNOSIS — O36.8990 UMBILICAL CORD CYST DURING PREGNANCY, ANTEPARTUM: ICD-10-CM

## 2020-06-30 ENCOUNTER — RESULTS ENCOUNTER (OUTPATIENT)
Dept: OBSTETRICS AND GYNECOLOGY | Facility: CLINIC | Age: 30
End: 2020-06-30

## 2020-06-30 DIAGNOSIS — O36.8990 UMBILICAL CORD CYST DURING PREGNANCY, ANTEPARTUM: ICD-10-CM

## 2020-07-03 ENCOUNTER — RESULTS ENCOUNTER (OUTPATIENT)
Dept: OBSTETRICS AND GYNECOLOGY | Facility: CLINIC | Age: 30
End: 2020-07-03

## 2020-07-03 DIAGNOSIS — O36.8990 UMBILICAL CORD CYST DURING PREGNANCY, ANTEPARTUM: ICD-10-CM

## 2020-07-07 ENCOUNTER — RESULTS ENCOUNTER (OUTPATIENT)
Dept: OBSTETRICS AND GYNECOLOGY | Facility: CLINIC | Age: 30
End: 2020-07-07

## 2020-07-07 DIAGNOSIS — O36.8990 UMBILICAL CORD CYST DURING PREGNANCY, ANTEPARTUM: ICD-10-CM

## 2020-07-21 ENCOUNTER — POSTPARTUM VISIT (OUTPATIENT)
Dept: OBSTETRICS AND GYNECOLOGY | Facility: CLINIC | Age: 30
End: 2020-07-21

## 2020-07-21 VITALS
BODY MASS INDEX: 29 KG/M2 | WEIGHT: 157.6 LBS | DIASTOLIC BLOOD PRESSURE: 74 MMHG | SYSTOLIC BLOOD PRESSURE: 120 MMHG | HEIGHT: 62 IN

## 2020-07-21 DIAGNOSIS — Z30.013 INITIATION OF DEPO PROVERA: ICD-10-CM

## 2020-07-21 DIAGNOSIS — Z09 POSTOPERATIVE FOLLOW-UP: Primary | ICD-10-CM

## 2020-07-21 DIAGNOSIS — Z98.891 STATUS POST REPEAT LOW TRANSVERSE CESAREAN SECTION: ICD-10-CM

## 2020-07-21 PROBLEM — O36.8990 UMBILICAL CORD CYST DURING PREGNANCY, ANTEPARTUM: Status: RESOLVED | Noted: 2020-05-19 | Resolved: 2020-07-21

## 2020-07-21 PROBLEM — O24.414 INSULIN CONTROLLED GESTATIONAL DIABETES MELLITUS (GDM) IN THIRD TRIMESTER: Status: RESOLVED | Noted: 2020-04-13 | Resolved: 2020-07-21

## 2020-07-21 PROBLEM — O09.90 SUPERVISION OF HIGH-RISK PREGNANCY: Status: RESOLVED | Noted: 2020-03-10 | Resolved: 2020-07-21

## 2020-07-21 PROBLEM — O09.299: Status: RESOLVED | Noted: 2020-03-10 | Resolved: 2020-07-21

## 2020-07-21 PROCEDURE — 96372 THER/PROPH/DIAG INJ SC/IM: CPT | Performed by: OBSTETRICS & GYNECOLOGY

## 2020-07-21 PROCEDURE — 99024 POSTOP FOLLOW-UP VISIT: CPT | Performed by: OBSTETRICS & GYNECOLOGY

## 2020-07-21 RX ORDER — MEDROXYPROGESTERONE ACETATE 150 MG/ML
150 INJECTION, SUSPENSION INTRAMUSCULAR ONCE
Status: COMPLETED | OUTPATIENT
Start: 2020-07-21 | End: 2020-07-21

## 2020-07-21 RX ADMIN — MEDROXYPROGESTERONE ACETATE 150 MG: 150 INJECTION, SUSPENSION INTRAMUSCULAR at 15:24

## 2020-07-21 NOTE — PROGRESS NOTES
"Chief complaint: Postpartum visit    SUBJECTIVE:   Pt is a 30 y.o.  now s/p repeat low transverse  section 5 weeks ago. Pt denies fever, abdominal pain, n/v/d/c, VB, Pt currently bottle feeding and taking PNV, ambulating without difficulty, urinating and stooling without complaints and tolerating normal diet.  Denies any depression, no SI or HI currently. Desires Depo-Provera for contraception.  OBJECTIVE:  /74 (BP Location: Left arm, Patient Position: Sitting, Cuff Size: Adult)   Ht 157.5 cm (62\")   Wt 71.5 kg (157 lb 9.6 oz)   LMP 10/05/2019   Breastfeeding No   BMI 28.83 kg/m²     Review of Systems   Constitutional: Negative for chills, fatigue and fever.   HENT: Negative for sore throat.    Eyes: Negative for visual disturbance.   Respiratory: Negative for cough, shortness of breath and wheezing.    Cardiovascular: Negative for chest pain, palpitations and leg swelling.   Gastrointestinal: Negative for abdominal pain, diarrhea, nausea and vomiting.   Genitourinary: Negative for dysuria, flank pain, frequency, vaginal bleeding, vaginal discharge and vaginal pain.   Neurological: Negative for syncope, light-headedness and headaches.   Psychiatric/Behavioral: Negative for dysphoric mood and suicidal ideas. The patient is not nervous/anxious.        Physical Exam   Constitutional: She is oriented to person, place, and time. She appears well-developed and well-nourished. No distress.   HENT:   Head: Normocephalic.   Abdominal: Soft. She exhibits no distension. There is no tenderness. There is no rebound and no guarding.   Well-healed Pfannenstiel incision, no erythema or evidence of infection.   Musculoskeletal: Normal range of motion.   Neurological: She is alert and oriented to person, place, and time.   Skin: Skin is warm and dry. She is not diaphoretic.   Psychiatric: She has a normal mood and affect. Her behavior is normal. Judgment and thought content normal.   Vitals " reviewed.      ASSESSMENT:    Pt is a 30 y.o.  s/p repeat low transverse  section doing well and healing appropriately at this time.  PLAN:   1.  Depo-Provera for contraception  2. Pt to continue to increase exercise/daily activities as tolerated   3. Continue prenatal vitamins   4. f/u prn    Med reconciliation completed.        This document has been electronically signed by Ron Guerra DO on 2020 14:23

## 2020-10-14 ENCOUNTER — CLINICAL SUPPORT (OUTPATIENT)
Dept: OBSTETRICS AND GYNECOLOGY | Facility: CLINIC | Age: 30
End: 2020-10-14

## 2020-10-14 DIAGNOSIS — Z30.42 SURVEILLANCE FOR DEPO-PROVERA CONTRACEPTION: Primary | ICD-10-CM

## 2020-10-14 LAB
B-HCG UR QL: NEGATIVE
INTERNAL NEGATIVE CONTROL: NEGATIVE
INTERNAL POSITIVE CONTROL: POSITIVE
Lab: NORMAL

## 2020-10-14 PROCEDURE — 81025 URINE PREGNANCY TEST: CPT | Performed by: NURSE PRACTITIONER

## 2020-10-14 PROCEDURE — 96372 THER/PROPH/DIAG INJ SC/IM: CPT | Performed by: NURSE PRACTITIONER

## 2020-10-14 RX ORDER — MEDROXYPROGESTERONE ACETATE 150 MG/ML
150 INJECTION, SUSPENSION INTRAMUSCULAR ONCE
Status: COMPLETED | OUTPATIENT
Start: 2020-10-14 | End: 2020-10-14

## 2020-10-14 RX ORDER — MEDROXYPROGESTERONE ACETATE 150 MG/ML
INJECTION, SUSPENSION INTRAMUSCULAR
Qty: 1 ML | Refills: 3 | Status: SHIPPED | OUTPATIENT
Start: 2020-10-14 | End: 2021-11-05 | Stop reason: SDUPTHER

## 2020-10-14 RX ADMIN — MEDROXYPROGESTERONE ACETATE 150 MG: 150 INJECTION, SUSPENSION INTRAMUSCULAR at 13:39

## 2020-10-14 NOTE — PROGRESS NOTES
Patient presents today for administration of depo injection.   Pregnancy test negative, patient tolerated injection well.

## 2020-10-19 DIAGNOSIS — R30.0 DYSURIA: Primary | ICD-10-CM

## 2020-10-22 ENCOUNTER — LAB (OUTPATIENT)
Dept: LAB | Facility: HOSPITAL | Age: 30
End: 2020-10-22

## 2020-10-22 LAB
BACTERIA UR QL AUTO: ABNORMAL /HPF
BILIRUB UR QL STRIP: NEGATIVE
CLARITY UR: ABNORMAL
COLOR UR: YELLOW
GLUCOSE UR STRIP-MCNC: NEGATIVE MG/DL
HGB UR QL STRIP.AUTO: NEGATIVE
HYALINE CASTS UR QL AUTO: ABNORMAL /LPF
KETONES UR QL STRIP: NEGATIVE
LEUKOCYTE ESTERASE UR QL STRIP.AUTO: NEGATIVE
NITRITE UR QL STRIP: NEGATIVE
PH UR STRIP.AUTO: 7 [PH] (ref 5–8)
PROT UR QL STRIP: NEGATIVE
RBC # UR: ABNORMAL /HPF
REF LAB TEST METHOD: ABNORMAL
SP GR UR STRIP: 1.02 (ref 1–1.03)
SQUAMOUS #/AREA URNS HPF: ABNORMAL /HPF
UROBILINOGEN UR QL STRIP: ABNORMAL
WBC UR QL AUTO: ABNORMAL /HPF

## 2020-10-22 PROCEDURE — 87086 URINE CULTURE/COLONY COUNT: CPT | Performed by: NURSE PRACTITIONER

## 2020-10-22 PROCEDURE — 81001 URINALYSIS AUTO W/SCOPE: CPT | Performed by: NURSE PRACTITIONER

## 2020-10-23 DIAGNOSIS — N30.01 ACUTE CYSTITIS WITH HEMATURIA: Primary | ICD-10-CM

## 2020-10-23 LAB — BACTERIA SPEC AEROBE CULT: ABNORMAL

## 2020-10-23 RX ORDER — NITROFURANTOIN 25; 75 MG/1; MG/1
100 CAPSULE ORAL 2 TIMES DAILY
Qty: 14 CAPSULE | Refills: 0 | Status: SHIPPED | OUTPATIENT
Start: 2020-10-23 | End: 2020-10-30

## 2021-01-14 ENCOUNTER — CLINICAL SUPPORT (OUTPATIENT)
Dept: OBSTETRICS AND GYNECOLOGY | Facility: CLINIC | Age: 31
End: 2021-01-14

## 2021-01-14 DIAGNOSIS — Z30.42 SURVEILLANCE FOR DEPO-PROVERA CONTRACEPTION: Primary | ICD-10-CM

## 2021-01-14 PROCEDURE — 96372 THER/PROPH/DIAG INJ SC/IM: CPT | Performed by: NURSE PRACTITIONER

## 2021-01-14 RX ORDER — MEDROXYPROGESTERONE ACETATE 150 MG/ML
150 INJECTION, SUSPENSION INTRAMUSCULAR ONCE
Status: COMPLETED | OUTPATIENT
Start: 2021-01-14 | End: 2021-01-14

## 2021-01-14 RX ADMIN — MEDROXYPROGESTERONE ACETATE 150 MG: 150 INJECTION, SUSPENSION INTRAMUSCULAR at 10:28

## 2021-04-09 ENCOUNTER — CLINICAL SUPPORT (OUTPATIENT)
Dept: OBSTETRICS AND GYNECOLOGY | Facility: CLINIC | Age: 31
End: 2021-04-09

## 2021-04-09 DIAGNOSIS — Z30.42 SURVEILLANCE FOR DEPO-PROVERA CONTRACEPTION: Primary | ICD-10-CM

## 2021-04-09 PROCEDURE — 96372 THER/PROPH/DIAG INJ SC/IM: CPT | Performed by: NURSE PRACTITIONER

## 2021-04-09 RX ORDER — MEDROXYPROGESTERONE ACETATE 150 MG/ML
150 INJECTION, SUSPENSION INTRAMUSCULAR ONCE
Status: COMPLETED | OUTPATIENT
Start: 2021-04-09 | End: 2021-04-09

## 2021-04-09 RX ADMIN — MEDROXYPROGESTERONE ACETATE 150 MG: 150 INJECTION, SUSPENSION INTRAMUSCULAR at 10:26

## 2021-08-11 ENCOUNTER — CLINICAL SUPPORT (OUTPATIENT)
Dept: OBSTETRICS AND GYNECOLOGY | Facility: CLINIC | Age: 31
End: 2021-08-11

## 2021-08-11 DIAGNOSIS — Z32.00 PREGNANCY EXAMINATION OR TEST, PREGNANCY UNCONFIRMED: Primary | ICD-10-CM

## 2021-08-11 DIAGNOSIS — Z30.42 SURVEILLANCE FOR DEPO-PROVERA CONTRACEPTION: ICD-10-CM

## 2021-08-11 LAB
B-HCG UR QL: NEGATIVE
INTERNAL NEGATIVE CONTROL: NEGATIVE
INTERNAL POSITIVE CONTROL: POSITIVE
Lab: 0

## 2021-08-11 PROCEDURE — 81025 URINE PREGNANCY TEST: CPT | Performed by: NURSE PRACTITIONER

## 2021-08-11 PROCEDURE — 96372 THER/PROPH/DIAG INJ SC/IM: CPT | Performed by: NURSE PRACTITIONER

## 2021-08-11 RX ORDER — MEDROXYPROGESTERONE ACETATE 150 MG/ML
150 INJECTION, SUSPENSION INTRAMUSCULAR ONCE
Status: COMPLETED | OUTPATIENT
Start: 2021-08-11 | End: 2021-08-11

## 2021-08-11 RX ADMIN — MEDROXYPROGESTERONE ACETATE 150 MG: 150 INJECTION, SUSPENSION INTRAMUSCULAR at 11:29

## 2021-11-05 ENCOUNTER — CLINICAL SUPPORT (OUTPATIENT)
Dept: OBSTETRICS AND GYNECOLOGY | Facility: CLINIC | Age: 31
End: 2021-11-05

## 2021-11-05 DIAGNOSIS — Z30.42 SURVEILLANCE FOR DEPO-PROVERA CONTRACEPTION: Primary | ICD-10-CM

## 2021-11-05 PROCEDURE — 96372 THER/PROPH/DIAG INJ SC/IM: CPT | Performed by: NURSE PRACTITIONER

## 2021-11-05 RX ORDER — MEDROXYPROGESTERONE ACETATE 150 MG/ML
INJECTION, SUSPENSION INTRAMUSCULAR
Qty: 1 ML | Refills: 3 | Status: SHIPPED | OUTPATIENT
Start: 2021-11-05

## 2021-11-05 RX ORDER — MEDROXYPROGESTERONE ACETATE 150 MG/ML
150 INJECTION, SUSPENSION INTRAMUSCULAR ONCE
Status: COMPLETED | OUTPATIENT
Start: 2021-11-05 | End: 2021-11-05

## 2021-11-05 RX ADMIN — MEDROXYPROGESTERONE ACETATE 150 MG: 150 INJECTION, SUSPENSION INTRAMUSCULAR at 11:07

## 2022-01-25 ENCOUNTER — CLINICAL SUPPORT (OUTPATIENT)
Dept: OBSTETRICS AND GYNECOLOGY | Facility: CLINIC | Age: 32
End: 2022-01-25

## 2022-01-25 DIAGNOSIS — Z30.42 ENCOUNTER FOR DEPO-PROVERA CONTRACEPTION: Primary | ICD-10-CM

## 2022-01-25 RX ORDER — MEDROXYPROGESTERONE ACETATE 150 MG/ML
150 INJECTION, SUSPENSION INTRAMUSCULAR
Status: DISCONTINUED | OUTPATIENT
Start: 2022-01-25 | End: 2022-01-25

## 2022-01-25 RX ORDER — MEDROXYPROGESTERONE ACETATE 150 MG/ML
INJECTION, SUSPENSION INTRAMUSCULAR
Qty: 1 ML | Refills: 3 | Status: SHIPPED | OUTPATIENT
Start: 2022-01-25 | End: 2022-01-25

## 2022-01-25 RX ORDER — MEDROXYPROGESTERONE ACETATE 150 MG/ML
150 INJECTION, SUSPENSION INTRAMUSCULAR
Status: SHIPPED | OUTPATIENT
Start: 2022-01-25

## 2022-01-25 RX ADMIN — MEDROXYPROGESTERONE ACETATE 150 MG: 150 INJECTION, SUSPENSION INTRAMUSCULAR at 15:22

## 2022-04-20 ENCOUNTER — CLINICAL SUPPORT (OUTPATIENT)
Dept: OBSTETRICS AND GYNECOLOGY | Facility: CLINIC | Age: 32
End: 2022-04-20

## 2022-04-20 RX ADMIN — MEDROXYPROGESTERONE ACETATE 150 MG: 150 INJECTION, SUSPENSION INTRAMUSCULAR at 09:30

## (undated) DEVICE — TRY SPINE PENCAN 24GA X4IN

## (undated) DEVICE — SUT VIC 3/0 CTI 36IN J944H

## (undated) DEVICE — PK C/SECT 60

## (undated) DEVICE — GLV SURG TRIUMPH LT PF LTX 8 STRL

## (undated) DEVICE — GLV SURG SENSICARE W/ALOE PF LF 8.5 STRL

## (undated) DEVICE — 3M™ STERI-STRIP™ REINFORCED ADHESIVE SKIN CLOSURES, R1547, 1/2 IN X 4 IN (12 MM X 100 MM), 6 STRIPS/ENVELOPE: Brand: 3M™ STERI-STRIP™

## (undated) DEVICE — ADHS LIQ MASTISOL 2/3ML

## (undated) DEVICE — SUT VIC 0 CTX 36IN J978H

## (undated) DEVICE — GARMENT,MEDLINE,DVT,INT,CALF,MED, GEN2: Brand: MEDLINE

## (undated) DEVICE — PAD,ABDOMINAL,8"X10",ST,LF: Brand: MEDLINE

## (undated) DEVICE — SUT MNCRYL 3/0 Y936H

## (undated) DEVICE — LARGE, DISPOSABLE ALEXIS O C-SECTION PROTECTOR - RETRACTOR: Brand: ALEXIS ® O C-SECTION PROTECTOR - RETRACTOR

## (undated) DEVICE — SUT MNCRYL 0/0 CTX 36IN Y398H

## (undated) DEVICE — POOLE SUCTION INSTRUMENT WITH REMOVABLE SHEATH: Brand: POOLE

## (undated) DEVICE — GOWN,PREVENTION PLUS,XLNG/XXLARGE,STRL: Brand: MEDLINE

## (undated) DEVICE — DRSNG TELFA PAD NONADH STR 1S 3X8IN